# Patient Record
Sex: FEMALE | Race: WHITE | NOT HISPANIC OR LATINO | Employment: PART TIME | URBAN - METROPOLITAN AREA
[De-identification: names, ages, dates, MRNs, and addresses within clinical notes are randomized per-mention and may not be internally consistent; named-entity substitution may affect disease eponyms.]

---

## 2019-01-04 VITALS — WEIGHT: 233.5 LBS

## 2019-01-04 PROBLEM — R79.89 ABNORMAL LFTS: Status: ACTIVE | Noted: 2019-01-04

## 2019-01-04 PROBLEM — E88.810 DYSMETABOLIC SYNDROME: Status: ACTIVE | Noted: 2019-01-04

## 2019-01-04 PROBLEM — M54.9 BACK PAIN: Status: ACTIVE | Noted: 2019-01-04

## 2019-01-04 PROBLEM — E28.2 PCOS (POLYCYSTIC OVARIAN SYNDROME): Status: ACTIVE | Noted: 2019-01-04

## 2019-01-04 PROBLEM — K21.9 GERD (GASTROESOPHAGEAL REFLUX DISEASE): Status: ACTIVE | Noted: 2019-01-04

## 2019-01-04 PROBLEM — G47.33 OSA (OBSTRUCTIVE SLEEP APNEA): Status: ACTIVE | Noted: 2019-01-04

## 2019-01-04 PROBLEM — J45.909 ASTHMA: Status: ACTIVE | Noted: 2019-01-04

## 2019-01-04 PROBLEM — D50.9 IRON DEFICIENCY ANEMIA: Status: ACTIVE | Noted: 2019-01-04

## 2019-01-04 PROBLEM — N30.10 INTERSTITIAL CYSTITIS: Status: ACTIVE | Noted: 2019-01-04

## 2019-01-04 PROBLEM — E88.81 DYSMETABOLIC SYNDROME: Status: ACTIVE | Noted: 2019-01-04

## 2019-01-04 PROBLEM — F32.A DEPRESSION: Status: ACTIVE | Noted: 2019-01-04

## 2019-01-04 PROBLEM — E05.90 HYPERTHYROIDISM: Status: ACTIVE | Noted: 2019-01-04

## 2019-01-04 PROBLEM — E78.5 HYPERLIPIDEMIA: Status: ACTIVE | Noted: 2019-01-04

## 2019-01-04 RX ORDER — METFORMIN HYDROCHLORIDE 500 MG/1
1000 TABLET, EXTENDED RELEASE ORAL
COMMUNITY
End: 2019-03-12 | Stop reason: SDUPTHER

## 2019-01-04 RX ORDER — MULTIPLE VITAMINS W/ MINERALS TAB 9MG-400MCG
2 TAB ORAL DAILY
COMMUNITY
End: 2022-08-15

## 2019-03-12 ENCOUNTER — OFFICE VISIT (OUTPATIENT)
Dept: BARIATRICS/WEIGHT MGMT | Facility: CLINIC | Age: 44
End: 2019-03-12

## 2019-03-12 VITALS
HEIGHT: 62 IN | DIASTOLIC BLOOD PRESSURE: 92 MMHG | SYSTOLIC BLOOD PRESSURE: 126 MMHG | WEIGHT: 231 LBS | BODY MASS INDEX: 42.51 KG/M2 | HEART RATE: 104 BPM

## 2019-03-12 DIAGNOSIS — E88.81 DYSMETABOLIC SYNDROME: ICD-10-CM

## 2019-03-12 DIAGNOSIS — F98.8 ATTENTION DEFICIT DISORDER, UNSPECIFIED HYPERACTIVITY PRESENCE: Primary | ICD-10-CM

## 2019-03-12 DIAGNOSIS — E66.01 OBESITY, CLASS III, BMI 40-49.9 (MORBID OBESITY) (HCC): ICD-10-CM

## 2019-03-12 DIAGNOSIS — F33.41 RECURRENT MAJOR DEPRESSIVE DISORDER, IN PARTIAL REMISSION (HCC): ICD-10-CM

## 2019-03-12 PROBLEM — M51.37 DEGENERATION OF LUMBOSACRAL INTERVERTEBRAL DISC: Status: ACTIVE | Noted: 2019-03-12

## 2019-03-12 PROBLEM — N18.1 STAGE 1 CHRONIC KIDNEY DISEASE: Status: ACTIVE | Noted: 2019-03-12

## 2019-03-12 PROBLEM — M51.379 DEGENERATION OF LUMBOSACRAL INTERVERTEBRAL DISC: Status: ACTIVE | Noted: 2019-03-12

## 2019-03-12 PROBLEM — F31.9 DEPRESSED BIPOLAR I DISORDER (HCC): Status: ACTIVE | Noted: 2019-03-12

## 2019-03-12 PROBLEM — F90.9 ATTENTION DEFICIT HYPERACTIVITY DISORDER: Status: ACTIVE | Noted: 2019-03-12

## 2019-03-12 PROBLEM — G56.00 CARPAL TUNNEL SYNDROME: Status: ACTIVE | Noted: 2019-03-12

## 2019-03-12 PROCEDURE — MEDWTESTPT: Performed by: NURSE PRACTITIONER

## 2019-03-12 PROCEDURE — MEDWTBODYCOMP: Performed by: NURSE PRACTITIONER

## 2019-03-12 RX ORDER — CLONIDINE HYDROCHLORIDE 0.1 MG/1
TABLET ORAL EVERY 12 HOURS SCHEDULED
COMMUNITY
End: 2020-02-27 | Stop reason: SDUPTHER

## 2019-03-12 RX ORDER — LEVOTHYROXINE SODIUM 0.07 MG/1
TABLET ORAL EVERY 24 HOURS
COMMUNITY

## 2019-03-12 RX ORDER — BUPROPION HYDROCHLORIDE 300 MG/1
TABLET ORAL
COMMUNITY
End: 2019-07-03

## 2019-03-12 RX ORDER — MONTELUKAST SODIUM 10 MG/1
TABLET ORAL
COMMUNITY
End: 2020-02-27 | Stop reason: SDUPTHER

## 2019-03-12 RX ORDER — BIOTIN 5 MG
TABLET ORAL
Start: 2019-03-12 | End: 2020-02-27

## 2019-03-12 RX ORDER — ESCITALOPRAM OXALATE 10 MG/1
TABLET ORAL EVERY 24 HOURS
COMMUNITY
End: 2020-05-19 | Stop reason: ALTCHOICE

## 2019-03-12 RX ORDER — ESOMEPRAZOLE MAGNESIUM 40 MG/1
FOR SUSPENSION ORAL
COMMUNITY
End: 2019-10-31 | Stop reason: SDUPTHER

## 2019-03-12 RX ORDER — CYCLOBENZAPRINE HCL 10 MG
TABLET ORAL
COMMUNITY

## 2019-03-12 RX ORDER — FEXOFENADINE HCL 180 MG/1
TABLET ORAL
COMMUNITY

## 2019-03-12 NOTE — PROGRESS NOTES
"[facility] [place] [facility]   [facility] [address]  [place] [address]    Name: Princess Bailey  : 1975  Patient Care Team:  Dre Ann MD as PCP - General (Family Medicine)    Chief Complaint;:   Chief Complaint   Patient presents with   • Weight Loss        HPI   Princess Bailey is a 44 y.o. female here to restart the weight loss program after an absence of 3 months. Patient is unsatisfied with weight loss progress and has no questions or concerns today.. There were no unexpected side effects. of previously used medications. The patient is not taking the recommended multivitamin and fish oil. The patient is not using a food journal. The BMI is Body mass index is 42.25 kg/m².. Has recently started TMS phychiatric therapy, is happy that depression is improving. Per patient the goal is to decrease psychiatric medications, knows she is at risk for serotonin syndrome.     The patient is exercising with a FITT score of:         Frequency Intensity Time Strength Training   [x]   0 [x]   0 [x]   0 [x]   0   []   1 (1-2x/week) []   1 (light) []   1 (<10 min) []   1 (1x/week)   []   2 (3-5x/week) []   2 (moderate) []   2 (10-20 min) []   2 (2x/week)   []   3 ([place])   []   3 (moderately hard)  []   4 (very hard) []   3 (20-30 min)  []   4 (>30 min) []   3 (3-4x/week)             VS   Vitals:    19 0841   BP: 126/92   BP Location: Left arm   Patient Position: Sitting   Cuff Size: Large Adult   Pulse: 104   Weight: 105 kg (231 lb)   Height: 157.5 cm (62\")       Change in weight since last visit: -3lb    Body composition analysis completed and showed:  %body fat: 47.2%  Total fat mass: 109lbs  Lean body mass: 122lbs    Measurements (in inches)  Neck: 15.5  Chest: 46.5  Waist: 46.5  Hips: 51  Thighs: 39.5    History    Past Medical History:   Diagnosis Date   • Anxiety    • Asthma    • Depression    • Fibromyalgia    • Hypertension    • Hypothyroid    • JELANI (obstructive sleep apnea)    • " PCOS (polycystic ovarian syndrome)    • PID (pelvic inflammatory disease)        Patient Active Problem List   Diagnosis   • Hyperlipidemia   • Dysmetabolic syndrome   • Abnormal LFTs   • Iron deficiency anemia   • Hyperthyroidism   • PCOS (polycystic ovarian syndrome)   • Depression   • JELANI (obstructive sleep apnea)   • Asthma   • GERD (gastroesophageal reflux disease)   • Back pain   • Interstitial cystitis   • ADD (attention deficit disorder)   • Attention deficit hyperactivity disorder   • Stage 1 chronic kidney disease   • Body mass index (BMI) of 40.0-44.9 in adult (CMS/[facility])   • Carpal tunnel syndrome   • Degeneration of lumbosacral intervertebral disc   • Depressed bipolar I disorder (CMS/[facility])       Allergies   Allergen Reactions   • Phenergan  [Promethazine Hcl] Anaphylaxis   • Contrast Dye Unknown (See Comments)     Was told from allergy testing   • Ibuprofen Itching   • Toradol [Ketorolac Tromethamine] Itching         Current Outpatient Medications:   •  Brexpiprazole (REXULTI) 0.5 MG tablet, Daily., Disp: , Rfl:   •  buPROPion XL (WELLBUTRIN XL) 300 MG 24 hr tablet, 1 tab(s), Disp: , Rfl:   •  Cholecalciferol (VITAMIN D3) 5000 units capsule capsule, Daily., Disp: , Rfl:   •  Chromium (CHROMEMATE PO), Take 1 capsule by mouth 3 (Three) Times a Day., Disp: , Rfl:   •  CloNIDine (CATAPRES) 0.1 MG tablet, Every 12 (Twelve) Hours., Disp: , Rfl:   •  cyclobenzaprine (FLEXERIL) 10 MG tablet, 1 tab(s), Disp: , Rfl:   •  escitalopram (LEXAPRO) 10 MG tablet, Daily., Disp: , Rfl:   •  esomeprazole (NexIUM) 40 MG packet, esomeprazole magnesium 40 mg capsule,delayed release, Disp: , Rfl:   •  fexofenadine (ALLEGRA ALLERGY) 180 MG tablet, Allegra 180 mg tablet  Daily, Disp: , Rfl:   •  levothyroxine (SYNTHROID) 75 MCG tablet, Daily., Disp: , Rfl:   •  Liraglutide (VICTOZA) 18 MG/3ML solution pen-injector injection, Daily., Disp: , Rfl:   •  lisdexamfetamine (VYVANSE) 50 MG capsule, 1 cap(s), Disp: , Rfl:   •  metFORMIN  (GLUCOPHAGE) 1000 MG tablet, Every 12 (Twelve) Hours., Disp: , Rfl:   •  Milnacipran HCl (SAVELLA) 100 MG tablet, Every 12 (Twelve) Hours., Disp: , Rfl:   •  montelukast (SINGULAIR) 10 MG tablet, 1 tab(s), Disp: , Rfl:   •  Multiple Vitamins-Minerals (MULTIVITAMIN WITH MINERALS) tablet tablet, Take 2 tablets by mouth Daily., Disp: , Rfl:   •  Unable to find, Take 1 each by mouth Daily. Med Name: Rodex Forte (( Timed released B-12 )), Disp: , Rfl:   •  Krill Oil 1000 MG capsule, Take one tablet twice daily., Disp: , Rfl:   •  Liraglutide (VICTOZA) 18 MG/3ML solution pen-injector injection, Inject 2.4 mg under the skin into the appropriate area as directed Daily. 2.4mg by subcutaneous route every day., Disp: 2 pen, Rfl: 2    Physical Exam:    General:  well developed; well nourished  no acute distress  obese    Lungs:  breathing is unlabored  clear to auscultation bilaterally   Heart:  regular rate and rhythm, S1, S2 normal, no murmur, click, rub or gallop       ASSESSMENT/PLAN:   Princess was seen today for weight loss.    Diagnoses and all orders for this visit:    Attention deficit disorder, unspecified hyperactivity presence    Obesity, Class III, BMI 40-49.9 (morbid obesity) (CMS/HCC)  -     Liraglutide (VICTOZA) 18 MG/3ML solution pen-injector injection; Inject 2.4 mg under the skin into the appropriate area as directed Daily. 2.4mg by subcutaneous route every day.  -     Krill Oil 1000 MG capsule; Take one tablet twice daily.    Dysmetabolic syndrome    Recurrent major depressive disorder, in partial remission (CMS/HCC)  Comments:  Psychiatrist: Dr. Sony Gruber at [facility] treating with TMS therapy.   local psychiatrist: ADAL Cifuentes with [facility]        Restart program and meds. Being back nutritional focus and work on lifestyle behavioral changes. Recommend restarting food journal using new goal card for guidance. she will work towards initial goal of loss of  5-10% of  beginning body weight     I have instructed patient to restart the pursuit of medical weight loss as a part of this program. Patient does meet criteria for use of anorectics at this time as BMI >27, body fat percentage >30%, hyperlipidemia and hypertension, however she is using vyvanse for ADD so we will avoid sympathomimetics. Victoza is very helpful in decreasing appetite, specifically carb cravings. Understands this is off label usage. The current plan for this month includes: patient will food journal, exercise, walk and hit their water goal at least 2 days per week.  Discussed SMART goals.     Plan of care reviewed with patient at the conclusion of today's visit. We discussed the risks, benefits, and limitations of treatments. Patient verbalizes understanding of and agreement with management plan.     Return in about 1 month (around 4/12/2019).     ZARINA Barraza

## 2019-03-18 DIAGNOSIS — E66.01 OBESITY, CLASS III, BMI 40-49.9 (MORBID OBESITY) (HCC): ICD-10-CM

## 2019-03-18 DIAGNOSIS — E88.81 DYSMETABOLIC SYNDROME: ICD-10-CM

## 2019-03-18 DIAGNOSIS — E28.2 PCOS (POLYCYSTIC OVARIAN SYNDROME): Primary | ICD-10-CM

## 2019-03-18 RX ORDER — METFORMIN HYDROCHLORIDE 500 MG/1
1000 TABLET, EXTENDED RELEASE ORAL 2 TIMES DAILY WITH MEALS
Qty: 360 TABLET | Refills: 0 | Status: SHIPPED | OUTPATIENT
Start: 2019-03-18 | End: 2019-06-08 | Stop reason: SDUPTHER

## 2019-03-18 RX ORDER — METFORMIN HYDROCHLORIDE 500 MG/1
1000 TABLET, EXTENDED RELEASE ORAL 2 TIMES DAILY WITH MEALS
COMMUNITY
End: 2019-03-18 | Stop reason: SDUPTHER

## 2019-03-18 NOTE — TELEPHONE ENCOUNTER
Pt called stating she was in office with Larisa last week. Metformin was not sent in. Pt requesting a 90 day supply due to insurance.

## 2019-04-09 ENCOUNTER — TELEPHONE (OUTPATIENT)
Dept: BARIATRICS/WEIGHT MGMT | Facility: CLINIC | Age: 44
End: 2019-04-09

## 2019-04-09 ENCOUNTER — OFFICE VISIT (OUTPATIENT)
Dept: BARIATRICS/WEIGHT MGMT | Facility: CLINIC | Age: 44
End: 2019-04-09

## 2019-04-09 VITALS
DIASTOLIC BLOOD PRESSURE: 90 MMHG | SYSTOLIC BLOOD PRESSURE: 142 MMHG | HEART RATE: 104 BPM | HEIGHT: 62 IN | WEIGHT: 230 LBS | BODY MASS INDEX: 42.33 KG/M2

## 2019-04-09 DIAGNOSIS — I10 ESSENTIAL HYPERTENSION: Primary | ICD-10-CM

## 2019-04-09 DIAGNOSIS — E66.01 OBESITY, CLASS III, BMI 40-49.9 (MORBID OBESITY) (HCC): ICD-10-CM

## 2019-04-09 DIAGNOSIS — E88.81 INSULIN RESISTANCE: ICD-10-CM

## 2019-04-09 PROCEDURE — MEDWTINJ PR MEDICAL WT LOSS INJECTION: Performed by: NURSE PRACTITIONER

## 2019-04-09 PROCEDURE — MEDWTESTPT: Performed by: NURSE PRACTITIONER

## 2019-04-09 RX ORDER — CYANOCOBALAMIN 1000 UG/ML
1000 INJECTION, SOLUTION INTRAMUSCULAR; SUBCUTANEOUS ONCE
Status: COMPLETED | OUTPATIENT
Start: 2019-04-09 | End: 2019-04-09

## 2019-04-09 RX ADMIN — CYANOCOBALAMIN 1000 MCG: 1000 INJECTION, SOLUTION INTRAMUSCULAR; SUBCUTANEOUS at 11:00

## 2019-04-09 NOTE — TELEPHONE ENCOUNTER
[facility] called wanting clarification on the Victoza for the patient. Call was forwarded to Larisa.

## 2019-04-09 NOTE — PROGRESS NOTES
"[facility] [place] [facility]   [address]  [place] [address]    Name: Princess Bailey  : 1975  Patient Care Team:  Dre Ann MD as PCP - General (Family Medicine)    Chief Complaint;:   Chief Complaint   Patient presents with   • Follow-up        HPI      Princess Bailey is a 44 y.o. female here to follow up in active weight loss. Patient is unsatisfied with weight loss progress and has no questions or concerns today.. There were no unexpected side effects.. The medication changes were savella was stopped.  There were no changes in medical or surgical history The patient is taking the recommended multivitamin and fish oil. Hunger control has remain unchanged The patient is not exercising. The patient is not using a food journal. The BMI is Body mass index is 42.07 kg/m²..  Has had increased stressors over the last 2 weeks.  Did notice some binge tendencies, still keeping junk food out of the house but occasionally stopping for sugary beverage.     The patient is exercising with a FITT score of:    Frequency Intensity Time Strength Training   [x]   0 [x]   0 [x]   0 [x]   0   []   1 []   1 []   1 []   1   []   2 []   2 []   2 []   2   []   3  []   4 []   3  []   4 []   3  []   4 []   3  []   4     Change in weight since last visit: 1lb lost    Current Weight: 230lbs .  Recent Weight History:  Wt Readings from Last 3 Encounters:   19 104 kg (230 lb)   19 105 kg (231 lb)   18 106 kg (233 lb 8 oz)     Start weight: 238lb  Total loss: 8lb/3.36% BBW    VS   Vitals:    19 0925   BP: 142/90   BP Location: Left arm   Patient Position: Sitting   Cuff Size: Large Adult   Pulse: 104   Weight: 104 kg (230 lb)   Height: 157.5 cm (62\")     Measurements (in inches)  Neck: 16  Chest: 47  Waist: 48  Hips: 50.5  Thighs: 39.5    Treatment Objectives    Weight Loss Goal: 5-10% loss of beginning body weight   Non-Weight Loss Goals: Improved blood glucose: has been " addressed., Improved mobility: has been addressed.  and Improved energy: has been addressed.     Past Medical History:   Diagnosis Date   • Anxiety    • Asthma    • Depression    • Fibromyalgia    • Hypertension    • Hypothyroid    • JELANI (obstructive sleep apnea)    • PCOS (polycystic ovarian syndrome)    • PID (pelvic inflammatory disease)        Patient Active Problem List   Diagnosis   • Hyperlipidemia   • Dysmetabolic syndrome   • Abnormal LFTs   • Iron deficiency anemia   • Hyperthyroidism   • PCOS (polycystic ovarian syndrome)   • Depression   • JELANI (obstructive sleep apnea)   • Asthma   • GERD (gastroesophageal reflux disease)   • Back pain   • Interstitial cystitis   • ADD (attention deficit disorder)   • Attention deficit hyperactivity disorder   • Stage 1 chronic kidney disease   • Body mass index (BMI) of 40.0-44.9 in adult (CMS/HCC)   • Carpal tunnel syndrome   • Degeneration of lumbosacral intervertebral disc   • Depressed bipolar I disorder (CMS/[facility])       Allergies   Allergen Reactions   • Phenergan  [Promethazine Hcl] Anaphylaxis   • Contrast Dye Unknown (See Comments)     Was told from allergy testing   • Ibuprofen Itching   • Toradol [Ketorolac Tromethamine] Itching         Current Outpatient Medications:   •  Brexpiprazole (REXULTI) 0.5 MG tablet, Daily., Disp: , Rfl:   •  buPROPion XL (WELLBUTRIN XL) 300 MG 24 hr tablet, 1 tab(s), Disp: , Rfl:   •  Cholecalciferol (VITAMIN D3) 5000 units capsule capsule, Daily., Disp: , Rfl:   •  Chromium (CHROMEMATE PO), Take 1 capsule by mouth 3 (Three) Times a Day., Disp: , Rfl:   •  CloNIDine (CATAPRES) 0.1 MG tablet, Every 12 (Twelve) Hours., Disp: , Rfl:   •  cyclobenzaprine (FLEXERIL) 10 MG tablet, 1 tab(s), Disp: , Rfl:   •  escitalopram (LEXAPRO) 10 MG tablet, Daily., Disp: , Rfl:   •  esomeprazole (NexIUM) 40 MG packet, esomeprazole magnesium 40 mg capsule,delayed release, Disp: , Rfl:   •  fexofenadine (ALLEGRA ALLERGY) 180 MG tablet, Allegra 180 mg  tablet  Daily, Disp: , Rfl:   •  Insulin Pen Needle 32G X 4 MM misc, Use with victoza injection pen., Disp: 100 each, Rfl: 0  •  Krill Oil 1000 MG capsule, Take one tablet twice daily., Disp: , Rfl:   •  levothyroxine (SYNTHROID) 75 MCG tablet, Daily., Disp: , Rfl:   •  Liraglutide (VICTOZA) 18 MG/3ML solution pen-injector injection, Inject 1.8 mg under the skin into the appropriate area as directed Daily., Disp: 3 pen, Rfl: 2  •  Liraglutide (VICTOZA) 18 MG/3ML solution pen-injector injection, Inject 0.6 mg under the skin into the appropriate area as directed Daily. 2.4mg by subcutaneous route every day., Disp: 1 pen, Rfl: 2  •  lisdexamfetamine (VYVANSE) 50 MG capsule, 1 cap(s), Disp: , Rfl:   •  metFORMIN ER (GLUCOPHAGE-XR) 500 MG 24 hr tablet, Take 2 tablets by mouth 2 (Two) Times a Day With Meals., Disp: 360 tablet, Rfl: 0  •  montelukast (SINGULAIR) 10 MG tablet, 1 tab(s), Disp: , Rfl:   •  Multiple Vitamins-Minerals (MULTIVITAMIN WITH MINERALS) tablet tablet, Take 2 tablets by mouth Daily., Disp: , Rfl:   •  Unable to find, Take 1 each by mouth Daily. Med Name: Rodex Forte (( Timed released B-12 )), Disp: , Rfl:   •  Milnacipran HCl (SAVELLA) 100 MG tablet, Every 12 (Twelve) Hours., Disp: , Rfl:     Current Facility-Administered Medications:   •  cyanocobalamin injection 1,000 mcg, 1,000 mcg, Intramuscular, Once, Larisa Mora, APRN    Physical Exam:    General:  .well developed; well nourished  no acute distress  obese    Lungs:  breathing is unlabored  clear to auscultation bilaterally   Heart:  regular rate and rhythm, S1, S2 normal, no murmur, click, rub or gallop       ASSESSMENT/PLAN:   Princess was seen today for follow-up.    Diagnoses and all orders for this visit:    Essential hypertension    Obesity, Class III, BMI 40-49.9 (morbid obesity) (CMS/[facility])  -     Liraglutide (VICTOZA) 18 MG/3ML solution pen-injector injection; Inject 1.8 mg under the skin into the appropriate area as directed Daily.  -      Liraglutide (VICTOZA) 18 MG/3ML solution pen-injector injection; Inject 0.6 mg under the skin into the appropriate area as directed Daily. 2.4mg by subcutaneous route every day.    Insulin resistance  -     cyanocobalamin injection 1,000 mcg      Blood pressure is slightly elevated today.  Has had stressful last couple of weeks.  We will continue to follow and consider low-dose blood pressure medication if not improved at next visit weight loss should help.  I have instructed the patient to continue with pursuit of medical weight loss as a part of this program. Continue nutritional focus and work towards new exercise FITT goal of:         Frequency Intensity Time Strength Training   []   0 []   0 []   0 [x]   0   []   1 [x]   1 [x]   1 []   1   [x]   2 []   2 []   2 []   2   []   3   []   3  []   4 []   3  []   4 []   3       The current plan for this month includes: adjust exercise as discussed, increase water to recommended daily amount, weight loss goal 4-6lbs this month and continue to work on lifestyle behavioral changes.  Hit water goal of 100 ounces at least twice a week, keep up food journaling, and add exercise.    Plan of care reviewed with the patient at the conclusion of today's visit. We discussed the risks, benefits, and limitations of treatments. Continue medications and OTC supplements as discussed. Patient verbalizes understanding of and agreement with management plan.      Return in about 1 month (around 5/9/2019).     ZARINA Barraza

## 2019-04-16 RX ORDER — CYANOCOBALAMIN 1000 UG/ML
1000 INJECTION, SOLUTION INTRAMUSCULAR; SUBCUTANEOUS ONCE
Status: DISCONTINUED | OUTPATIENT
Start: 2019-04-16 | End: 2019-10-31

## 2019-06-08 DIAGNOSIS — E88.81 DYSMETABOLIC SYNDROME: ICD-10-CM

## 2019-06-08 DIAGNOSIS — E28.2 PCOS (POLYCYSTIC OVARIAN SYNDROME): ICD-10-CM

## 2019-06-10 RX ORDER — METFORMIN HYDROCHLORIDE 500 MG/1
TABLET, EXTENDED RELEASE ORAL
Qty: 360 TABLET | Refills: 0 | Status: SHIPPED | OUTPATIENT
Start: 2019-06-10 | End: 2019-07-03 | Stop reason: SDUPTHER

## 2019-07-03 ENCOUNTER — OFFICE VISIT (OUTPATIENT)
Dept: BARIATRICS/WEIGHT MGMT | Facility: CLINIC | Age: 44
End: 2019-07-03

## 2019-07-03 VITALS
HEIGHT: 62 IN | HEART RATE: 92 BPM | BODY MASS INDEX: 42.05 KG/M2 | DIASTOLIC BLOOD PRESSURE: 80 MMHG | SYSTOLIC BLOOD PRESSURE: 138 MMHG | WEIGHT: 228.5 LBS

## 2019-07-03 DIAGNOSIS — E88.81 DYSMETABOLIC SYNDROME: ICD-10-CM

## 2019-07-03 DIAGNOSIS — E66.01 OBESITY, CLASS III, BMI 40-49.9 (MORBID OBESITY) (HCC): ICD-10-CM

## 2019-07-03 DIAGNOSIS — E28.2 PCOS (POLYCYSTIC OVARIAN SYNDROME): ICD-10-CM

## 2019-07-03 PROCEDURE — MEDWTESTPT: Performed by: NURSE PRACTITIONER

## 2019-07-03 PROCEDURE — MEDWTINJ PR MEDICAL WT LOSS INJECTION: Performed by: NURSE PRACTITIONER

## 2019-07-03 RX ORDER — METFORMIN HYDROCHLORIDE 500 MG/1
1000 TABLET, EXTENDED RELEASE ORAL 2 TIMES DAILY WITH MEALS
Qty: 360 TABLET | Refills: 0 | Status: SHIPPED | OUTPATIENT
Start: 2019-07-03 | End: 2019-07-31

## 2019-07-03 RX ORDER — CYANOCOBALAMIN 1000 UG/ML
1000 INJECTION, SOLUTION INTRAMUSCULAR; SUBCUTANEOUS ONCE
Status: COMPLETED | OUTPATIENT
Start: 2019-07-03 | End: 2019-07-03

## 2019-07-03 RX ADMIN — CYANOCOBALAMIN 1000 MCG: 1000 INJECTION, SOLUTION INTRAMUSCULAR; SUBCUTANEOUS at 11:05

## 2019-07-03 NOTE — PROGRESS NOTES
[facility] [place] [facility]   [facility] [address]  [place] [address]    Name: Princess Bailey  : 1975  Patient Care Team:  Dre Ann MD as PCP - General (Family Medicine)    Chief Complaint;:   Chief Complaint   Patient presents with   • Follow-up        HPI      Princess Bailey is a 44 y.o. female here to follow up in active weight loss. Patient is unsatisfied with weight loss progress and has concerns about lack of motivation. There were no unexpected side effects.. The medication changes were, Wellbutrin and Savella were discontinued by another provider. There were no changes in medical or surgical history The patient is not taking the recommended multivitamin and fish oil. Hunger control has remain unchanged The patient is not exercising. The patient is not using a food journal. The BMI is Body mass index is 41.79 kg/m².. Depression is very well controlled per patient, but she is struggling with lack of motivation to lose weight. She discussed that she knows she needs to lose weight due to family history of heart disease, but has been unable to make changes in habits due to lifestyle challenges and family obligations.     The patient is exercising with a FITT score of:    Frequency   Intensity Time Strength Training   [x]   0 None  [x]   0 None  [x]   0 None  [x]   0 None    []   1 (1-2x/week) []   1 (light) []   1 (<10 min) []   1 (1x/week)   []   2 (3-5x/week) []   2 (moderate) []   2 (10-20 min) []   2 (2x/week)   []   3 (daily)   []   3 (moderately hard)  []   4 (very hard) []   3 (20-30 min)  []   4 (>30 min) []   3 (3-4x/week)       Change in weight since last visit: 1.5lbs lost    Current Weight: 228.5lbs .  Recent Weight History:    Wt Readings from Last 3 Encounters:   19 104 kg (228 lb 8 oz)   19 104 kg (230 lb)   19 105 kg (231 lb)     Start Weight: 238lbs  Total Loss/%Loss of BBW: -3.99%    VS   Vitals:    19 1017   BP: 138/80   BP  "Location: Right arm   Patient Position: Sitting   Cuff Size: Large Adult   Pulse: 92   Weight: 104 kg (228 lb 8 oz)   Height: 157.5 cm (62\")       Measurements (in inches)  Neck: 15.5  Chest: 44  Waist: 47  Hips: 50  Thighs: 38    Treatment Objectives   Weight Loss Goal: 5-10% loss of beginning body weight   Non-Weight Loss Goals: Improved blood pressure: has been addressed. and Improved energy: has been addressed.     Past Medical History:   Diagnosis Date   • Anxiety    • Asthma    • Depression    • Fibromyalgia    • Hypertension    • Hypothyroid    • JELANI (obstructive sleep apnea)    • PCOS (polycystic ovarian syndrome)    • PID (pelvic inflammatory disease)        Patient Active Problem List   Diagnosis   • Hyperlipidemia   • Dysmetabolic syndrome   • Abnormal LFTs   • Iron deficiency anemia   • Hyperthyroidism   • PCOS (polycystic ovarian syndrome)   • Depression   • JELANI (obstructive sleep apnea)   • Asthma   • GERD (gastroesophageal reflux disease)   • Back pain   • Interstitial cystitis   • ADD (attention deficit disorder)   • Attention deficit hyperactivity disorder   • Stage 1 chronic kidney disease   • Body mass index (BMI) of 40.0-44.9 in adult (CMS/HCC)   • Carpal tunnel syndrome   • Degeneration of lumbosacral intervertebral disc   • Depressed bipolar I disorder (CMS/HCC)       Allergies   Allergen Reactions   • Phenergan  [Promethazine Hcl] Anaphylaxis   • Contrast Dye Unknown (See Comments)     Was told from allergy testing   • Ibuprofen Itching   • Toradol [Ketorolac Tromethamine] Itching         Current Outpatient Medications:   •  Brexpiprazole (REXULTI) 0.5 MG tablet, Daily., Disp: , Rfl:   •  Cholecalciferol (VITAMIN D3) 5000 units capsule capsule, Daily., Disp: , Rfl:   •  Chromium (CHROMEMATE PO), Take 1 capsule by mouth 3 (Three) Times a Day., Disp: , Rfl:   •  CloNIDine (CATAPRES) 0.1 MG tablet, Every 12 (Twelve) Hours., Disp: , Rfl:   •  cyclobenzaprine (FLEXERIL) 10 MG tablet, 1 tab(s), " Disp: , Rfl:   •  escitalopram (LEXAPRO) 10 MG tablet, Daily., Disp: , Rfl:   •  esomeprazole (NexIUM) 40 MG packet, esomeprazole magnesium 40 mg capsule,delayed release, Disp: , Rfl:   •  fexofenadine (ALLEGRA ALLERGY) 180 MG tablet, Allegra 180 mg tablet  Daily, Disp: , Rfl:   •  Insulin Pen Needle 32G X 4 MM misc, Use with victoza injection pen., Disp: 100 each, Rfl: 0  •  Krill Oil 1000 MG capsule, Take one tablet twice daily., Disp: , Rfl:   •  levothyroxine (SYNTHROID) 75 MCG tablet, Daily., Disp: , Rfl:   •  Liraglutide (VICTOZA) 18 MG/3ML solution pen-injector injection, Inject 1.8 mg under the skin into the appropriate area as directed Daily., Disp: 3 pen, Rfl: 2  •  Liraglutide (VICTOZA) 18 MG/3ML solution pen-injector injection, Inject 0.6 mg under the skin into the appropriate area as directed Daily. 2.4mg by subcutaneous route every day., Disp: 1 pen, Rfl: 2  •  lisdexamfetamine (VYVANSE) 50 MG capsule, 1 cap(s), Disp: , Rfl:   •  metFORMIN ER (GLUCOPHAGE-XR) 500 MG 24 hr tablet, Take 2 tablets by mouth 2 (Two) Times a Day With Meals., Disp: 360 tablet, Rfl: 0  •  montelukast (SINGULAIR) 10 MG tablet, 1 tab(s), Disp: , Rfl:   •  Multiple Vitamins-Minerals (MULTIVITAMIN WITH MINERALS) tablet tablet, Take 2 tablets by mouth Daily., Disp: , Rfl:   •  Unable to find, Take 1 each by mouth Daily. Med Name: Rodex Forte (( Timed released B-12 )), Disp: , Rfl:   •  RODEX FORTE, Take 1 capsule by mouth Daily. Take one to two capsules daily., Disp: , Rfl:     Current Facility-Administered Medications:   •  cyanocobalamin injection 1,000 mcg, 1,000 mcg, Intramuscular, Once, Larisa Mora APRN    Physical Exam:    General:  .well developed; well nourished  no acute distress  obese    Lungs:  breathing is unlabored  clear to auscultation bilaterally   Heart:  regular rate and rhythm, S1, S2 normal, no murmur, click, rub or gallop       ASSESSMENT/PLAN:   Princess was seen today for follow-up.    Diagnoses and all  orders for this visit:    Obesity, Class III, BMI 40-49.9 (morbid obesity) (CMS/[facility])  -     Liraglutide (VICTOZA) 18 MG/3ML solution pen-injector injection; Inject 1.8 mg under the skin into the appropriate area as directed Daily.  -     Liraglutide (VICTOZA) 18 MG/3ML solution pen-injector injection; Inject 0.6 mg under the skin into the appropriate area as directed Daily. 2.4mg by subcutaneous route every day.    PCOS (polycystic ovarian syndrome)  -     metFORMIN ER (GLUCOPHAGE-XR) 500 MG 24 hr tablet; Take 2 tablets by mouth 2 (Two) Times a Day With Meals.    Dysmetabolic syndrome  -     metFORMIN ER (GLUCOPHAGE-XR) 500 MG 24 hr tablet; Take 2 tablets by mouth 2 (Two) Times a Day With Meals.  -     cyanocobalamin injection 1,000 mcg  -     RODEX FORTE; Take 1 capsule by mouth Daily. Take one to two capsules daily.    I have instructed the patient to continue with pursuit of medical weight loss as a part of this program. Continue nutritional focus and work towards new exercise FITT goal of:     Frequency   Intensity Time Strength Training   []   0 None  []   0 None  []   0 None  []   0 None    []   1 (1-2x/week) [x]   1 (light) []   1 (<10 min) [x]   1 (1x/week)   [x]   2 (3-5x/week) [x]   2 (moderate) []   2 (10-20 min) []   2 (2x/week)   []   3 (daily)   []   3 (moderately hard)  []   4 (very hard) []   3 (20-30 min)  [x]   4 (>30 min) []   3 (3-4x/week)       The current plan for this month includes: goal calendar discussed: exercise, water, meal replacement for lunch. Advised to find psychologist to help with motivation, continue current medications. Find exercise accountability partner ( or friend). Goal 4-6lb.      Plan of care reviewed with the patient at the conclusion of today's visit. We discussed the risks, benefits, and limitations of treatments. Continue medications and OTC supplements as discussed. Patient verbalizes understanding of and agreement with management plan.      Return in about 1  month (around 8/3/2019) for Next scheduled follow up.     Larisa Mora APRN

## 2019-07-31 ENCOUNTER — OFFICE VISIT (OUTPATIENT)
Dept: BARIATRICS/WEIGHT MGMT | Facility: CLINIC | Age: 44
End: 2019-07-31

## 2019-07-31 VITALS
BODY MASS INDEX: 41.49 KG/M2 | HEIGHT: 62 IN | HEART RATE: 92 BPM | DIASTOLIC BLOOD PRESSURE: 80 MMHG | SYSTOLIC BLOOD PRESSURE: 118 MMHG | WEIGHT: 225.5 LBS

## 2019-07-31 DIAGNOSIS — E66.01 OBESITY, CLASS III, BMI 40-49.9 (MORBID OBESITY) (HCC): ICD-10-CM

## 2019-07-31 DIAGNOSIS — E88.81 DYSMETABOLIC SYNDROME: ICD-10-CM

## 2019-07-31 DIAGNOSIS — E28.2 PCOS (POLYCYSTIC OVARIAN SYNDROME): ICD-10-CM

## 2019-07-31 DIAGNOSIS — R53.83 OTHER FATIGUE: ICD-10-CM

## 2019-07-31 DIAGNOSIS — R19.7 DIARRHEA, UNSPECIFIED TYPE: Primary | ICD-10-CM

## 2019-07-31 PROCEDURE — MEDWTESTPT: Performed by: NURSE PRACTITIONER

## 2019-07-31 PROCEDURE — MEDWTINJ PR MEDICAL WT LOSS INJECTION: Performed by: NURSE PRACTITIONER

## 2019-07-31 RX ORDER — CYANOCOBALAMIN 1000 UG/ML
1000 INJECTION, SOLUTION INTRAMUSCULAR; SUBCUTANEOUS ONCE
Status: COMPLETED | OUTPATIENT
Start: 2019-07-31 | End: 2019-07-31

## 2019-07-31 RX ORDER — METFORMIN HYDROCHLORIDE 500 MG/1
TABLET, EXTENDED RELEASE ORAL
Qty: 270 TABLET | Refills: 0
Start: 2019-07-31 | End: 2019-10-31 | Stop reason: SDUPTHER

## 2019-07-31 RX ORDER — ZINC OXIDE 13 %
1 CREAM (GRAM) TOPICAL DAILY
Start: 2019-07-31 | End: 2020-02-27

## 2019-07-31 RX ADMIN — CYANOCOBALAMIN 1000 MCG: 1000 INJECTION, SOLUTION INTRAMUSCULAR; SUBCUTANEOUS at 10:55

## 2019-07-31 NOTE — PROGRESS NOTES
"[facility]   [facility] [address]  [place] [address]    Name: Princess Bailey  : 1975  Patient Care Team:  Dre Ann MD as PCP - General (Family Medicine)    Chief Complaint;:   Chief Complaint   Patient presents with   • Follow-up        HPI      Princess Bailey is a 44 y.o. female here to follow up in active weight loss. Patient is satisfied with weight loss progress and has no questions or concerns today.. There were no unexpected side effects.. There were no medication changes There were no changes in medical or surgical history The patient is taking the recommended multivitamin and is not taking fish oil. Hunger control has worsened  The patient is not exercising. The patient is not using a food journal. The BMI is Body mass index is 41.24 kg/m².. Has had a few more caffeinated beverages due to being tired from  being out of town, otherwise hit water goal most days this month. Took MVI every day this month.     The patient is exercising with a FITT score of:    Frequency   Intensity Time Strength Training   [x]   0 None  [x]   0 None  [x]   0 None  [x]   0 None    []   1 (1-2x/week) []   1 (light) []   1 (<10 min) []   1 (1x/week)   []   2 (3-5x/week) []   2 (moderate) []   2 (10-20 min) []   2 (2x/week)   []   3 (daily)   []   3 (moderately hard)  []   4 (very hard) []   3 (20-30 min)  []   4 (>30 min) []   3 (3-4x/week)       Change in weight since last visit: 3lbs lost    Current Weight: 225.5lbs .  Recent Weight History:   Wt Readings from Last 3 Encounters:   19 102 kg (225 lb 8 oz)   19 104 kg (228 lb 8 oz)   19 104 kg (230 lb)     Start Weight: 238lbs  Total Loss/%Loss of BBW: -5.25%    VS   Vitals:    19 1018   BP: 118/80   BP Location: Left arm   Patient Position: Sitting   Cuff Size: Large Adult   Pulse: 92   Weight: 102 kg (225 lb 8 oz)   Height: 157.5 cm (62\")       Measurements (in inches)  Neck: 16  Chest: " 47.5  Waist: 48  Hips: 51  Thighs: 39    Treatment Objectives   Weight Loss Goal: 5-10% loss of beginning body weight   Non-Weight Loss Goals: Improved blood glucose: has been addressed. and Improved energy: has been addressed.     Past Medical History:   Diagnosis Date   • Anxiety    • Asthma    • Depression    • Fibromyalgia    • Hypertension    • Hypothyroid    • JELANI (obstructive sleep apnea)    • PCOS (polycystic ovarian syndrome)    • PID (pelvic inflammatory disease)        Patient Active Problem List   Diagnosis   • Hyperlipidemia   • Dysmetabolic syndrome   • Abnormal LFTs   • Iron deficiency anemia   • Hyperthyroidism   • PCOS (polycystic ovarian syndrome)   • Depression   • JELANI (obstructive sleep apnea)   • Asthma   • GERD (gastroesophageal reflux disease)   • Back pain   • Interstitial cystitis   • ADD (attention deficit disorder)   • Attention deficit hyperactivity disorder   • Stage 1 chronic kidney disease   • Body mass index (BMI) of 40.0-44.9 in adult (CMS/HCC)   • Carpal tunnel syndrome   • Degeneration of lumbosacral intervertebral disc   • Depressed bipolar I disorder (CMS/HCC)       Allergies   Allergen Reactions   • Phenergan  [Promethazine Hcl] Anaphylaxis   • Contrast Dye Unknown (See Comments)     Was told from allergy testing   • Ibuprofen Itching   • Toradol [Ketorolac Tromethamine] Itching         Current Outpatient Medications:   •  Brexpiprazole (REXULTI) 0.5 MG tablet, Daily., Disp: , Rfl:   •  Cholecalciferol (VITAMIN D3) 5000 units capsule capsule, Daily., Disp: , Rfl:   •  Chromium (CHROMEMATE PO), Take 1 capsule by mouth 3 (Three) Times a Day., Disp: , Rfl:   •  CloNIDine (CATAPRES) 0.1 MG tablet, Every 12 (Twelve) Hours., Disp: , Rfl:   •  cyclobenzaprine (FLEXERIL) 10 MG tablet, 1 tab(s), Disp: , Rfl:   •  escitalopram (LEXAPRO) 10 MG tablet, Daily., Disp: , Rfl:   •  esomeprazole (NexIUM) 40 MG packet, esomeprazole magnesium 40 mg capsule,delayed release, Disp: , Rfl:   •   fexofenadine (ALLEGRA ALLERGY) 180 MG tablet, Allegra 180 mg tablet  Daily, Disp: , Rfl:   •  Insulin Pen Needle 32G X 4 MM misc, Use with victoza injection pen., Disp: 100 each, Rfl: 0  •  Krill Oil 1000 MG capsule, Take one tablet twice daily., Disp: , Rfl:   •  levothyroxine (SYNTHROID) 75 MCG tablet, Daily., Disp: , Rfl:   •  Liraglutide (VICTOZA) 18 MG/3ML solution pen-injector injection, Inject 1.8 mg under the skin into the appropriate area as directed Daily., Disp: 3 pen, Rfl: 2  •  Liraglutide (VICTOZA) 18 MG/3ML solution pen-injector injection, Inject 1.2 mg under the skin into the appropriate area as directed Daily. 2.4mg by subcutaneous route every day., Disp: 1 pen, Rfl: 2  •  lisdexamfetamine (VYVANSE) 50 MG capsule, 1 cap(s), Disp: , Rfl:   •  metFORMIN ER (GLUCOPHAGE-XR) 500 MG 24 hr tablet, Take one tablet by mouth in the morning and two by mouth with evening meal., Disp: 270 tablet, Rfl: 0  •  montelukast (SINGULAIR) 10 MG tablet, 1 tab(s), Disp: , Rfl:   •  Multiple Vitamins-Minerals (MULTIVITAMIN WITH MINERALS) tablet tablet, Take 2 tablets by mouth Daily., Disp: , Rfl:   •  RODEX FORTE, Take 1 capsule by mouth Daily. Take one to two capsules daily., Disp: , Rfl:   •  Unable to find, Take 1 each by mouth Daily. Med Name: Rodex Forte (( Timed released B-12 )), Disp: , Rfl:   •  Probiotic Product (PROBIOTIC DAILY) capsule, Take 1 tablet by mouth Daily., Disp: , Rfl:     Current Facility-Administered Medications:   •  cyanocobalamin injection 1,000 mcg, 1,000 mcg, Intramuscular, Once, Larisa Mora APRN  •  cyanocobalamin injection 1,000 mcg, 1,000 mcg, Intramuscular, Once, Larisa Mora APRN    Physical Exam:    General:  .well developed; well nourished  no acute distress  obese    Lungs:  breathing is unlabored  clear to auscultation bilaterally   Heart:  regular rate and rhythm, S1, S2 normal, no murmur, click, rub or gallop       ASSESSMENT/PLAN:   Princess was seen today for  follow-up.    Diagnoses and all orders for this visit:    Diarrhea, unspecified type  -     Probiotic Product (PROBIOTIC DAILY) capsule; Take 1 tablet by mouth Daily.    PCOS (polycystic ovarian syndrome)  -     metFORMIN ER (GLUCOPHAGE-XR) 500 MG 24 hr tablet; Take one tablet by mouth in the morning and two by mouth with evening meal.    Dysmetabolic syndrome  -     metFORMIN ER (GLUCOPHAGE-XR) 500 MG 24 hr tablet; Take one tablet by mouth in the morning and two by mouth with evening meal.    Obesity, Class III, BMI 40-49.9 (morbid obesity) (CMS/[facility])  -     Liraglutide (VICTOZA) 18 MG/3ML solution pen-injector injection; Inject 1.2 mg under the skin into the appropriate area as directed Daily. 2.4mg by subcutaneous route every day.  -     Probiotic Product (PROBIOTIC DAILY) capsule; Take 1 tablet by mouth Daily.  -     cyanocobalamin injection 1,000 mcg    Other fatigue  -     cyanocobalamin injection 1,000 mcg        I have instructed the patient to continue with pursuit of medical weight loss as a part of this program. Continue nutritional focus and work towards new exercise FITT goal of:     Frequency   Intensity Time Strength Training   []   0 None  []   0 None  []   0 None  []   0 None    []   1 (1-2x/week) [x]   1 (light) []   1 (<10 min) [x]   1 (1x/week)   [x]   2 (3-5x/week) [x]   2 (moderate) []   2 (10-20 min) []   2 (2x/week)   []   3 (daily)   []   3 (moderately hard)  []   4 (very hard) []   3 (20-30 min)  [x]   4 (>30 min) []   3 (3-4x/week)       The current plan for this month includes: Keep up great new habit of daily MVI, bring back focus with water intake, add exercise and FJ this month. Inc saxenda for improved hunger control. Decrease metformin to 1500mg due to constant diarrhea, take with food. Consider probiotic. Continue to hold FO until diarrhea resolves.     Plan of care reviewed with the patient at the conclusion of today's visit. We discussed the risks, benefits, and limitations of  treatments. Continue medications and OTC supplements as discussed. Patient verbalizes understanding of and agreement with management plan.      Return in about 1 month (around 8/31/2019) for Next scheduled follow up.     ZARINA Barraza

## 2019-09-03 ENCOUNTER — OFFICE VISIT (OUTPATIENT)
Dept: BARIATRICS/WEIGHT MGMT | Facility: CLINIC | Age: 44
End: 2019-09-03

## 2019-09-03 VITALS
HEART RATE: 92 BPM | WEIGHT: 224 LBS | HEIGHT: 62 IN | BODY MASS INDEX: 41.22 KG/M2 | DIASTOLIC BLOOD PRESSURE: 80 MMHG | SYSTOLIC BLOOD PRESSURE: 128 MMHG

## 2019-09-03 DIAGNOSIS — E66.01 OBESITY, CLASS III, BMI 40-49.9 (MORBID OBESITY) (HCC): ICD-10-CM

## 2019-09-03 DIAGNOSIS — E88.81 DYSMETABOLIC SYNDROME: ICD-10-CM

## 2019-09-03 PROCEDURE — MEDWTESTPT: Performed by: NURSE PRACTITIONER

## 2019-09-03 RX ORDER — HYDROXYZINE PAMOATE 25 MG/1
25 CAPSULE ORAL 3 TIMES DAILY PRN
Refills: 0 | COMMUNITY
Start: 2019-08-12

## 2019-09-03 RX ORDER — CYANOCOBALAMIN 1000 UG/ML
1000 INJECTION, SOLUTION INTRAMUSCULAR; SUBCUTANEOUS ONCE
Status: DISCONTINUED | OUTPATIENT
Start: 2019-09-03 | End: 2019-10-31

## 2019-09-03 RX ORDER — ESOMEPRAZOLE MAGNESIUM 40 MG/1
40 CAPSULE, DELAYED RELEASE ORAL DAILY
Refills: 11 | COMMUNITY
Start: 2019-08-05

## 2019-09-03 NOTE — PROGRESS NOTES
"[facility]   [facility] [address]  [place] [address]    Name: Princess Bailey  : 1975  Patient Care Team:  Dre Ann MD as PCP - General (Family Medicine)    Chief Complaint;:   Chief Complaint   Patient presents with   • Follow-up   • Nutrition Counseling        HPI      Princess Bailey is a 44 y.o. female here to follow up in active weight loss. Patient is satisfied with weight loss progress and has no questions or concerns today.. There were no unexpected side effects.. There were no medication changes There were no changes in medical or surgical history The patient is taking the recommended multivitamin and fish oil. Hunger control has improved The patient is exercising. The patient is not using a food journal. The BMI is Body mass index is 40.97 kg/m².. Has increased exercise.     The patient is exercising with a FITT score of:    Frequency   Intensity Time Strength Training   []   0 None  []   0 None  []   0 None  [x]   0 None    [x]   1 (1-2x/week) [x]   1 (light) []   1 (<10 min) []   1 (1x/week)   []   2 (3-5x/week) []   2 (moderate) []   2 (10-20 min) []   2 (2x/week)   []   3 (daily)   []   3 (moderately hard)  []   4 (very hard) [x]   3 (20-30 min)  []   4 (>30 min) []   3 (3-4x/week)       Change in weight since last visit: 1lb8oz   Current Weight:224  Recent Weight History:    Wt Readings from Last 3 Encounters:   19 102 kg (224 lb)   19 102 kg (225 lb 8 oz)   19 104 kg (228 lb 8 oz)     Start Weight: 238    VS   Vitals:    19 1139   BP: 128/80   BP Location: Left arm   Patient Position: Sitting   Cuff Size: Adult   Pulse: 92   Weight: 102 kg (224 lb)   Height: 157.5 cm (62\")       Measurements (in inches)  Waist: 47    Treatment Objectives   Weight Loss Goal: 5-10% loss of beginning body weight   Non-Weight Loss Goals: Improved blood pressure: has been addressed. and Improved energy: has been addressed.     Past Medical " History:   Diagnosis Date   • Anxiety    • Asthma    • Depression    • Fibromyalgia    • Hypertension    • Hypothyroid    • JELANI (obstructive sleep apnea)    • PCOS (polycystic ovarian syndrome)    • PID (pelvic inflammatory disease)        Patient Active Problem List   Diagnosis   • Hyperlipidemia   • Dysmetabolic syndrome   • Abnormal LFTs   • Iron deficiency anemia   • Hyperthyroidism   • PCOS (polycystic ovarian syndrome)   • Depression   • JELANI (obstructive sleep apnea)   • Asthma   • GERD (gastroesophageal reflux disease)   • Back pain   • Interstitial cystitis   • ADD (attention deficit disorder)   • Attention deficit hyperactivity disorder   • Stage 1 chronic kidney disease   • Body mass index (BMI) of 40.0-44.9 in adult (CMS/HCC)   • Carpal tunnel syndrome   • Degeneration of lumbosacral intervertebral disc   • Depressed bipolar I disorder (CMS/[facility])       Allergies   Allergen Reactions   • Phenergan  [Promethazine Hcl] Anaphylaxis   • Contrast Dye Unknown (See Comments)     Was told from allergy testing   • Ibuprofen Itching   • Toradol [Ketorolac Tromethamine] Itching         Current Outpatient Medications:   •  Brexpiprazole (REXULTI) 0.5 MG tablet, Daily., Disp: , Rfl:   •  Cholecalciferol (VITAMIN D3) 5000 units capsule capsule, Daily., Disp: , Rfl:   •  Chromium (CHROMEMATE PO), Take 1 capsule by mouth 3 (Three) Times a Day., Disp: , Rfl:   •  CloNIDine (CATAPRES) 0.1 MG tablet, Every 12 (Twelve) Hours., Disp: , Rfl:   •  cyclobenzaprine (FLEXERIL) 10 MG tablet, 1 tab(s), Disp: , Rfl:   •  escitalopram (LEXAPRO) 10 MG tablet, Daily., Disp: , Rfl:   •  esomeprazole (nexIUM) 40 MG capsule, Take 40 mg by mouth Daily., Disp: , Rfl: 11  •  fexofenadine (ALLEGRA ALLERGY) 180 MG tablet, Allegra 180 mg tablet  Daily, Disp: , Rfl:   •  Insulin Pen Needle 32G X 4 MM misc, Use with victoza injection pen., Disp: 100 each, Rfl: 0  •  levothyroxine (SYNTHROID) 75 MCG tablet, Daily., Disp: , Rfl:   •  Liraglutide  (VICTOZA) 18 MG/3ML solution pen-injector injection, Inject 1.8 mg under the skin into the appropriate area as directed Daily., Disp: 3 pen, Rfl: 2  •  Liraglutide (VICTOZA) 18 MG/3ML solution pen-injector injection, Inject 1.2 mg under the skin into the appropriate area as directed Daily. 3.0mg by subcutaneous route every day., Disp: 1 pen, Rfl: 2  •  lisdexamfetamine (VYVANSE) 50 MG capsule, 1 cap(s), Disp: , Rfl:   •  metFORMIN ER (GLUCOPHAGE-XR) 500 MG 24 hr tablet, Take one tablet by mouth in the morning and two by mouth with evening meal. (Patient taking differently: Take 500 mg by mouth. Taking 1000mg in am and two 500mg in the pm.), Disp: 270 tablet, Rfl: 0  •  montelukast (SINGULAIR) 10 MG tablet, 1 tab(s), Disp: , Rfl:   •  Multiple Vitamins-Minerals (MULTIVITAMIN WITH MINERALS) tablet tablet, Take 2 tablets by mouth Daily., Disp: , Rfl:   •  Probiotic Product (PROBIOTIC DAILY) capsule, Take 1 tablet by mouth Daily., Disp: , Rfl:   •  RODEX FORTE, Take 1 capsule by mouth Daily. Take one to two capsules daily., Disp: , Rfl:   •  esomeprazole (NexIUM) 40 MG packet, esomeprazole magnesium 40 mg capsule,delayed release, Disp: , Rfl:   •  hydrOXYzine pamoate (VISTARIL) 25 MG capsule, Take 25 mg by mouth 3 (Three) Times a Day As Needed., Disp: , Rfl: 0  •  Krill Oil 1000 MG capsule, Take one tablet twice daily., Disp: , Rfl:   •  Unable to find, Take 1 each by mouth Daily. Med Name: Rodex Forte (( Timed released B-12 )), Disp: , Rfl:     Current Facility-Administered Medications:   •  cyanocobalamin injection 1,000 mcg, 1,000 mcg, Intramuscular, Once, Larisa Mora, ZARINA    Physical Exam:    General:  .well developed; well nourished  no acute distress  obese    Lungs:  breathing is unlabored  clear to auscultation bilaterally   Heart:  regular rate and rhythm, S1, S2 normal, no murmur, click, rub or gallop       ASSESSMENT/PLAN:   Princess was seen today for follow-up and nutrition counseling.    Diagnoses and  all orders for this visit:    Body mass index (BMI) of 40.0-44.9 in adult (CMS/[facility])    Dysmetabolic syndrome  -     Insulin Pen Needle 32G X 4 MM misc; Use with victoza injection pen.    Obesity, Class III, BMI 40-49.9 (morbid obesity) (CMS/[facility])  -     Liraglutide (VICTOZA) 18 MG/3ML solution pen-injector injection; Inject 1.8 mg under the skin into the appropriate area as directed Daily.  -     Liraglutide (VICTOZA) 18 MG/3ML solution pen-injector injection; Inject 1.2 mg under the skin into the appropriate area as directed Daily. 3.0mg by subcutaneous route every day.  -     Insulin Pen Needle 32G X 4 MM misc; Use with victoza injection pen.     I have instructed the patient to continue with pursuit of medical weight loss as a part of this program. Continue nutritional focus and work towards new exercise FITT goal of:     Frequency   Intensity Time Strength Training   []   0 None  []   0 None  []   0 None  []   0 None    [x]   1 (1-2x/week) [x]   1 (light) []   1 (<10 min) []   1 (1x/week)   []   2 (3-5x/week) []   2 (moderate) []   2 (10-20 min) [x]   2 (2x/week)   []   3 (daily)   []   3 (moderately hard)  []   4 (very hard) [x]   3 (20-30 min)  []   4 (>30 min) []   3 (3-4x/week)       The current plan for this month includes: Adding resistance training with water aerobics twice a week, re-establish relationship with FJ, keep up great water and daily MVI.     Plan of care reviewed with the patient at the conclusion of today's visit. We discussed the risks, benefits, and limitations of treatments. Continue medications and OTC supplements as discussed. Patient verbalizes understanding of and agreement with management plan.      Return in about 1 month (around 10/3/2019) for Next scheduled follow up.     ZARINA Barraza

## 2019-10-31 ENCOUNTER — OFFICE VISIT (OUTPATIENT)
Dept: BARIATRICS/WEIGHT MGMT | Facility: CLINIC | Age: 44
End: 2019-10-31

## 2019-10-31 VITALS
DIASTOLIC BLOOD PRESSURE: 70 MMHG | WEIGHT: 226 LBS | BODY MASS INDEX: 41.59 KG/M2 | HEIGHT: 62 IN | HEART RATE: 80 BPM | SYSTOLIC BLOOD PRESSURE: 110 MMHG

## 2019-10-31 DIAGNOSIS — E88.81 DYSMETABOLIC SYNDROME: ICD-10-CM

## 2019-10-31 DIAGNOSIS — R53.83 OTHER FATIGUE: Primary | ICD-10-CM

## 2019-10-31 DIAGNOSIS — E28.2 PCOS (POLYCYSTIC OVARIAN SYNDROME): ICD-10-CM

## 2019-10-31 PROCEDURE — MEDWTESTPT: Performed by: NURSE PRACTITIONER

## 2019-10-31 RX ORDER — ALPRAZOLAM 0.5 MG/1
TABLET ORAL
COMMUNITY
Start: 2019-10-29

## 2019-10-31 RX ORDER — METFORMIN HYDROCHLORIDE 500 MG/1
TABLET, EXTENDED RELEASE ORAL
Qty: 270 TABLET | Refills: 0 | Status: SHIPPED | OUTPATIENT
Start: 2019-10-31 | End: 2020-01-20 | Stop reason: SDUPTHER

## 2019-10-31 RX ORDER — CYANOCOBALAMIN 1000 UG/ML
1000 INJECTION, SOLUTION INTRAMUSCULAR; SUBCUTANEOUS ONCE
Status: DISCONTINUED | OUTPATIENT
Start: 2019-10-31 | End: 2020-02-27

## 2019-10-31 NOTE — PROGRESS NOTES
"[facility]   [address]  [place] [address]    Name: Princess Bailey  : 1975  Patient Care Team:  Dre Ann MD as PCP - General (Family Medicine)    Chief Complaint;:   Chief Complaint   Patient presents with   • Weight Loss   • Nutrition Counseling        HPI      Princess Bailey is a 44 y.o. female here to follow up in active weight loss. Patient is unsatisfied with weight loss progress and has no questions or concerns today.. There were no unexpected side effects.. The medication changes were, patient is now taking 10mg of Lexapro, and is also taking Xanax PRN. There were no changes in medical or surgical history The patient is taking the recommended multivitamin and is not taking fish oil. Hunger control has remain unchanged The patient is not exercising. The patient is not using a food journal. The BMI is Body mass index is 41.34 kg/m².. Recently had conversation with  that she needs help/encouragement with exercise. Is not making any restrictions on herself right now, states her focus has been \"none\". Works from home, is having 2-4 sodas or sweets teas/day, not making any limits on those.     The patient is exercising with a FITT score of:    Frequency   Intensity Time Strength Training   [x]   0 None  [x]   0 None  [x]   0 None  [x]   0 None    []   1 (1-2x/week) []   1 (light) []   1 (<10 min) []   1 (1x/week)   []   2 (3-5x/week) []   2 (moderate) []   2 (10-20 min) []   2 (2x/week)   []   3 (daily)   []   3 (moderately hard)  []   4 (very hard) []   3 (20-30 min)  []   4 (>30 min) []   3 (3-4x/week)       Change in weight since last visit: 2lbs gained    Current Weight: 226lbs .  Recent Weight History:   Wt Readings from Last 3 Encounters:   10/31/19 103 kg (226 lb)   19 102 kg (224 lb)   19 102 kg (225 lb 8 oz)     Start Weight: 238lbs  Total Loss/%Loss of BBW:  -5.04%    VS   Vitals:    10/31/19 0826   BP: 110/70   BP " "Location: Left arm   Patient Position: Sitting   Cuff Size: Large Adult   Pulse: 80   Weight: 103 kg (226 lb)   Height: 157.5 cm (62\")       Measurements (in inches)  Neck: 15.5  Chest: 45  Waist: 46  Hips: 50  Thighs: 38    Treatment Objectives   Weight Loss Goal: 5-10% loss of beginning body weight   Non-Weight Loss Goals: Improved blood glucose: has been addressed., Improved blood pressure: has been addressed., Improved fatty liver: has been addressed. and Improved depression: has been addressed.    Past Medical History:   Diagnosis Date   • Anxiety    • Asthma    • Depression    • Fibromyalgia    • Hypertension    • Hypothyroid    • JELANI (obstructive sleep apnea)    • PCOS (polycystic ovarian syndrome)    • PID (pelvic inflammatory disease)        Patient Active Problem List   Diagnosis   • Hyperlipidemia   • Dysmetabolic syndrome   • Abnormal LFTs   • Iron deficiency anemia   • Hyperthyroidism   • PCOS (polycystic ovarian syndrome)   • Depression   • JELANI (obstructive sleep apnea)   • Asthma   • GERD (gastroesophageal reflux disease)   • Back pain   • Interstitial cystitis   • ADD (attention deficit disorder)   • Attention deficit hyperactivity disorder   • Stage 1 chronic kidney disease   • Body mass index (BMI) of 40.0-44.9 in adult (CMS/HCC)   • Carpal tunnel syndrome   • Degeneration of lumbosacral intervertebral disc   • Depressed bipolar I disorder (CMS/HCC)       Allergies   Allergen Reactions   • Phenergan  [Promethazine Hcl] Anaphylaxis   • Contrast Dye Unknown (See Comments)     Was told from allergy testing   • Ibuprofen Itching   • Toradol [Ketorolac Tromethamine] Itching         Current Outpatient Medications:   •  ALPRAZolam (XANAX) 0.5 MG tablet, , Disp: , Rfl:   •  Brexpiprazole (REXULTI) 0.5 MG tablet, Daily., Disp: , Rfl:   •  Cholecalciferol (VITAMIN D3) 5000 units capsule capsule, Daily., Disp: , Rfl:   •  Chromium (CHROMEMATE PO), Take 1 capsule by mouth 3 (Three) Times a Day., Disp: , Rfl: "   •  CloNIDine (CATAPRES) 0.1 MG tablet, Every 12 (Twelve) Hours., Disp: , Rfl:   •  cyclobenzaprine (FLEXERIL) 10 MG tablet, 1 tab(s), Disp: , Rfl:   •  escitalopram (LEXAPRO) 10 MG tablet, Daily., Disp: , Rfl:   •  esomeprazole (nexIUM) 40 MG capsule, Take 40 mg by mouth Daily., Disp: , Rfl: 11  •  fexofenadine (ALLEGRA ALLERGY) 180 MG tablet, Allegra 180 mg tablet  Daily, Disp: , Rfl:   •  hydrOXYzine pamoate (VISTARIL) 25 MG capsule, Take 25 mg by mouth 3 (Three) Times a Day As Needed., Disp: , Rfl: 0  •  Insulin Pen Needle 32G X 4 MM misc, Use with victoza injection pen., Disp: 100 each, Rfl: 0  •  Krill Oil 1000 MG capsule, Take one tablet twice daily., Disp: , Rfl:   •  levothyroxine (SYNTHROID) 75 MCG tablet, Daily., Disp: , Rfl:   •  Liraglutide (VICTOZA) 18 MG/3ML solution pen-injector injection, Inject 1.8 mg under the skin into the appropriate area as directed Daily., Disp: 3 pen, Rfl: 2  •  Liraglutide (VICTOZA) 18 MG/3ML solution pen-injector injection, Inject 1.2 mg under the skin into the appropriate area as directed Daily. 3.0mg by subcutaneous route every day., Disp: 1 pen, Rfl: 2  •  lisdexamfetamine (VYVANSE) 50 MG capsule, 1 cap(s), Disp: , Rfl:   •  metFORMIN ER (GLUCOPHAGE-XR) 500 MG 24 hr tablet, Take one tablet by mouth in the morning and two by mouth with evening meal., Disp: 270 tablet, Rfl: 0  •  montelukast (SINGULAIR) 10 MG tablet, 1 tab(s), Disp: , Rfl:   •  Multiple Vitamins-Minerals (MULTIVITAMIN WITH MINERALS) tablet tablet, Take 2 tablets by mouth Daily., Disp: , Rfl:   •  Probiotic Product (PROBIOTIC DAILY) capsule, Take 1 tablet by mouth Daily., Disp: , Rfl:   •  RODEX FORTE, Take 1 capsule by mouth Daily. Take one to two capsules daily., Disp: , Rfl:   •  Unable to find, Take 1 each by mouth Daily. Med Name: Vidya Lopez (( Timed released B-12 )), Disp: , Rfl:     Current Facility-Administered Medications:   •  cyanocobalamin injection 1,000 mcg, 1,000 mcg, Intramuscular, Once,  "Larisa Mora, APRN    Physical Exam:    General:  .well developed; well nourished  no acute distress  obese    Lungs:  breathing is unlabored  clear to auscultation bilaterally   Heart:  regular rate and rhythm, S1, S2 normal, no murmur, click, rub or gallop       ASSESSMENT/PLAN:   Princess was seen today for weight loss and nutrition counseling.    Diagnoses and all orders for this visit:    Other fatigue  -     cyanocobalamin injection 1,000 mcg    PCOS (polycystic ovarian syndrome)  -     metFORMIN ER (GLUCOPHAGE-XR) 500 MG 24 hr tablet; Take one tablet by mouth in the morning and two by mouth with evening meal.    Dysmetabolic syndrome  -     metFORMIN ER (GLUCOPHAGE-XR) 500 MG 24 hr tablet; Take one tablet by mouth in the morning and two by mouth with evening meal.      We spent a significant amount of time discussing motivation for weight loss. Knows she is not making any effort to reach her goal of losing weight to \"be healthier and decrease heart disease risk\". Not putting any restrictions on food or beverage intake, not exercising.       I have instructed the patient to continue with pursuit of medical weight loss as a part of this program. Patient does meet criteria for use of anorectics at this time as BMI >27. Continue nutritional focus and work towards new exercise FITT goal of:     Frequency   Intensity Time Strength Training   []   0 None  []   0 None  []   0 None  [x]   0 None    [x]   1 (1-2x/week) [x]   1 (light) []   1 (<10 min) []   1 (1x/week)   []   2 (3-5x/week) []   2 (moderate) [x]   2 (10-20 min) []   2 (2x/week)   []   3 (daily)   []   3 (moderately hard)  []   4 (very hard) []   3 (20-30 min)  []   4 (>30 min) []   3 (3-4x/week)       The current plan for this month includes: increase water in order to decrease soft drinks and sweet tea (2-4/day currently).      Plan of care reviewed with the patient at the conclusion of today's visit. We discussed the risks, benefits, and limitations of " treatments. Continue medications and OTC supplements as discussed. Patient verbalizes understanding of and agreement with management plan.      Return in about 1 month (around 11/30/2019) for Next scheduled follow up.     ZARINA Barraza

## 2020-01-20 DIAGNOSIS — E66.01 OBESITY, CLASS III, BMI 40-49.9 (MORBID OBESITY) (HCC): ICD-10-CM

## 2020-01-20 DIAGNOSIS — E88.81 DYSMETABOLIC SYNDROME: ICD-10-CM

## 2020-01-20 DIAGNOSIS — E28.2 PCOS (POLYCYSTIC OVARIAN SYNDROME): ICD-10-CM

## 2020-01-20 RX ORDER — METFORMIN HYDROCHLORIDE 500 MG/1
TABLET, EXTENDED RELEASE ORAL
Qty: 270 TABLET | Refills: 0 | Status: SHIPPED | OUTPATIENT
Start: 2020-01-20 | End: 2020-04-01 | Stop reason: SDUPTHER

## 2020-01-20 NOTE — TELEPHONE ENCOUNTER
Provider:  Trip Oscar)   Caller:  Automated refill request  Last visit:   10/31/2019  Next visit: 02/25/2020    Reason for call:         Requested Prescriptions     Pending Prescriptions Disp Refills   • Liraglutide (VICTOZA) 18 MG/3ML solution pen-injector injection 3 pen 2     Sig: Inject 1.8 mg under the skin into the appropriate area as directed Daily.   • metFORMIN ER (GLUCOPHAGE-XR) 500 MG 24 hr tablet 270 tablet 0     Sig: Take one tablet by mouth in the morning and two by mouth with evening meal.

## 2020-01-20 NOTE — TELEPHONE ENCOUNTER
Pt called to make new appt and is requesting refill on Victoza and Metformin sent to [facility] in [place].    Pt also noted that at least a 90 day supply will need to be sent for her insurance to cover it.

## 2020-02-27 ENCOUNTER — OFFICE VISIT (OUTPATIENT)
Dept: BARIATRICS/WEIGHT MGMT | Facility: CLINIC | Age: 45
End: 2020-02-27

## 2020-02-27 VITALS
WEIGHT: 235.5 LBS | HEART RATE: 113 BPM | OXYGEN SATURATION: 99 % | HEIGHT: 62 IN | BODY MASS INDEX: 43.34 KG/M2 | RESPIRATION RATE: 18 BRPM | DIASTOLIC BLOOD PRESSURE: 78 MMHG | TEMPERATURE: 97.2 F | SYSTOLIC BLOOD PRESSURE: 124 MMHG

## 2020-02-27 DIAGNOSIS — E88.81 DYSMETABOLIC SYNDROME: ICD-10-CM

## 2020-02-27 DIAGNOSIS — E28.2 PCOS (POLYCYSTIC OVARIAN SYNDROME): ICD-10-CM

## 2020-02-27 PROCEDURE — MEDWTESTPT: Performed by: NURSE PRACTITIONER

## 2020-02-27 RX ORDER — CLONIDINE HYDROCHLORIDE 0.1 MG/1
TABLET ORAL EVERY 12 HOURS SCHEDULED
COMMUNITY
End: 2021-03-23

## 2020-02-27 RX ORDER — CYANOCOBALAMIN 1000 UG/ML
1000 INJECTION, SOLUTION INTRAMUSCULAR; SUBCUTANEOUS ONCE
Status: DISCONTINUED | OUTPATIENT
Start: 2020-02-27 | End: 2020-05-19

## 2020-02-27 RX ORDER — LEVOTHYROXINE SODIUM 0.07 MG/1
TABLET ORAL EVERY 24 HOURS
COMMUNITY
End: 2020-02-27 | Stop reason: SDUPTHER

## 2020-02-27 RX ORDER — MONTELUKAST SODIUM 10 MG/1
TABLET ORAL
COMMUNITY
End: 2020-02-27 | Stop reason: SDUPTHER

## 2020-02-27 RX ORDER — MONTELUKAST SODIUM 10 MG/1
TABLET ORAL
COMMUNITY

## 2020-02-27 RX ORDER — ESCITALOPRAM OXALATE 20 MG/1
TABLET ORAL
COMMUNITY
End: 2020-02-27 | Stop reason: SDUPTHER

## 2020-02-27 NOTE — PROGRESS NOTES
[facility]   [address]  [place] [address]    Name: Princess Baiely  : 1975  Patient Care Team:  Dre Ann MD as PCP - General (Family Medicine)    Chief Complaint;:   Chief Complaint   Patient presents with   • Nutrition Counseling   • Weight Loss        HPI      Princess Bailey is a 45 y.o. female here to follow up in active weight loss. Patient is unsatisfied with weight loss progress and has no questions or concerns today.. There were no unexpected side effects.. The medication changes were Rexulti was increased to 1mg. There were no changes in medical or surgical history The patient is taking the recommended multivitamin. Hunger control has remain unchanged The patient is not exercising. The patient is not using a food journal. The BMI is Body mass index is 43.07 kg/m².43.1. Started seeing behavioralist who can help with over-eating. Over the last few weeks has been having anxiety, describes sharp pain in her mid-upper back and chest tightness/heaviness. Only lasts a few minutes and resolves when she lays down and relaxes. Not associated with activity. No shortness of air. Under a lot of stress with daughter graduating high school this year.     The patient is exercising with a FITT score of:    Frequency   Intensity Time Strength Training   [x]   0 None  [x]   0 None  [x]   0 None  [x]   0 None    []   1 (1-2x/week) []   1 (light) []   1 (<10 min) []   1 (1x/week)   []   2 (3-5x/week) []   2 (moderate) []   2 (10-20 min) []   2 (2x/week)   []   3 (daily)   []   3 (moderately hard)  []   4 (very hard) []   3 (20-30 min)  []   4 (>30 min) []   3 (3-4x/week)       Change in weight since last visit: 9.5 lb gain     Current Weight: 235.5 lb.  Recent Weight History:    Wt Readings from Last 3 Encounters:   20 107 kg (235 lb 8 oz)   10/31/19 103 kg (226 lb)   19 102 kg (224 lb)     Start Weight: 238 lb  Total Loss/%Loss of BBW: -1.05%    VS  "  Vitals:    02/27/20 1430   BP: 124/78   BP Location: Left arm   Patient Position: Sitting   Cuff Size: Large Adult   Pulse: 113   Resp: 18   Temp: 97.2 °F (36.2 °C)   TempSrc: Temporal   SpO2: 99%   Weight: 107 kg (235 lb 8 oz)   Height: 157.5 cm (62\")       Measurements (in inches)  Waist: 48.5    Body composition analysis   BMR: 1752  %body fat:50.2%  Total fat mass (in lbs):118.0  Fat free mass (in lbs):117.5  Total body water (in lb):86.0      Past Medical History:   Diagnosis Date   • Anxiety    • Asthma    • Depression    • Fibromyalgia    • Hypertension    • Hypothyroid    • JELANI (obstructive sleep apnea)    • PCOS (polycystic ovarian syndrome)    • PID (pelvic inflammatory disease)        Patient Active Problem List   Diagnosis   • Hyperlipidemia   • Dysmetabolic syndrome   • Abnormal LFTs   • Iron deficiency anemia   • Hyperthyroidism   • PCOS (polycystic ovarian syndrome)   • Depression   • JELANI (obstructive sleep apnea)   • Asthma   • GERD (gastroesophageal reflux disease)   • Back pain   • Interstitial cystitis   • ADD (attention deficit disorder)   • Attention deficit hyperactivity disorder   • Stage 1 chronic kidney disease   • Body mass index (BMI) of 40.0-44.9 in adult (CMS/HCC)   • Carpal tunnel syndrome   • Degeneration of lumbosacral intervertebral disc   • Depressed bipolar I disorder (CMS/HCC)       Allergies   Allergen Reactions   • Phenergan  [Promethazine Hcl] Anaphylaxis   • Contrast Dye Unknown (See Comments)     Was told from allergy testing   • Ibuprofen Itching   • Toradol [Ketorolac Tromethamine] Itching         Current Outpatient Medications:   •  ALPRAZolam (XANAX) 0.5 MG tablet, , Disp: , Rfl:   •  Cholecalciferol (VITAMIN D3) 5000 units capsule capsule, Daily., Disp: , Rfl:   •  cyclobenzaprine (FLEXERIL) 10 MG tablet, 1 tab(s), Disp: , Rfl:   •  Elastic Bandages & Supports (WRIST BRACE/LEFT MEDIUM) misc, -, Disp: , Rfl:   •  escitalopram (LEXAPRO) 10 MG tablet, Daily., Disp: , Rfl: "   •  esomeprazole (nexIUM) 40 MG capsule, Take 40 mg by mouth Daily., Disp: , Rfl: 11  •  fexofenadine (ALLEGRA ALLERGY) 180 MG tablet, Allegra 180 mg tablet  Daily, Disp: , Rfl:   •  hydrOXYzine pamoate (VISTARIL) 25 MG capsule, Take 25 mg by mouth 3 (Three) Times a Day As Needed., Disp: , Rfl: 0  •  Insulin Pen Needle 32G X 4 MM misc, Use with victoza injection pen., Disp: 100 each, Rfl: 0  •  levothyroxine (SYNTHROID) 75 MCG tablet, Daily., Disp: , Rfl:   •  Liraglutide (VICTOZA) 18 MG/3ML solution pen-injector injection, Inject 1.2 mg under the skin into the appropriate area as directed Daily. 3.0mg by subcutaneous route every day., Disp: 1 pen, Rfl: 2  •  Liraglutide (VICTOZA) 18 MG/3ML solution pen-injector injection, Inject 1.8 mg under the skin into the appropriate area as directed Daily., Disp: 3 pen, Rfl: 2  •  lisdexamfetamine (VYVANSE) 50 MG capsule, 1 cap(s), Disp: , Rfl:   •  metFORMIN ER (GLUCOPHAGE-XR) 500 MG 24 hr tablet, Take one tablet by mouth in the morning and two by mouth with evening meal., Disp: 270 tablet, Rfl: 0  •  Multiple Vitamins-Minerals (MULTIVITAMIN WITH MINERALS) tablet tablet, Take 2 tablets by mouth Daily., Disp: , Rfl:   •  RODEX FORTE, Take 1 capsule by mouth Daily. Take one to two capsules daily., Disp: , Rfl:   •  Brexpiprazole (REXULTI) 1 MG tablet, Rexulti 1 mg tablet, Disp: , Rfl:   •  cloNIDine (CATAPRES) 0.1 MG tablet, Every 12 (Twelve) Hours., Disp: , Rfl:   •  montelukast (SINGULAIR) 10 MG tablet, montelukast 10 mg tablet, Disp: , Rfl:     Current Facility-Administered Medications:   •  cyanocobalamin injection 1,000 mcg, 1,000 mcg, Intramuscular, Once, Larisa Mora, APRN    Physical Exam:    General:  .well developed; well nourished  no acute distress  obese    Lungs:  breathing is unlabored  clear to auscultation bilaterally   Heart:  regular rate and rhythm, S1, S2 normal, no murmur, click, rub or gallop       ASSESSMENT/PLAN:   Princess was seen today for nutrition  counseling and weight loss.    Diagnoses and all orders for this visit:    Dysmetabolic syndrome  -     RODEX FORTE; Take 1 capsule by mouth Daily. Take one to two capsules daily.  -     cyanocobalamin injection 1,000 mcg    PCOS (polycystic ovarian syndrome)    I have instructed the patient to continue with pursuit of medical weight loss as a part of this program. Patient does meet criteria for use of anorectics at this time as BMI >27. Continue nutritional focus and work towards new exercise FITT goal of:     Frequency   Intensity Time Strength Training   []   0 None  []   0 None  []   0 None  []   0 None    [x]   1 (1-2x/week) [x]   1 (light) [x]   1 (<10 min) [x]   1 (1x/week)   []   2 (3-5x/week) []   2 (moderate) []   2 (10-20 min) []   2 (2x/week)   []   3 (daily)   []   3 (moderately hard)  []   4 (very hard) []   3 (20-30 min)  []   4 (>30 min) []   3 (3-4x/week)       The current plan for this month includes: Continue weekly visits with behavioralist. Continue current weight loss medications. Discuss chest symptoms with Dr. Ann at appointment this month.     I spent 30 minutes face to face with patient and 20 minutes were spent counseling the patient on obesity and behavior change.     Plan of care reviewed with the patient at the conclusion of today's visit. We discussed the risks, benefits, and limitations of treatments. Continue medications and OTC supplements as discussed. Patient verbalizes understanding of and agreement with management plan.      Return in about 1 month (around 3/27/2020) for Next scheduled follow up.     ZARINA Barraza

## 2020-04-01 DIAGNOSIS — E88.81 DYSMETABOLIC SYNDROME: ICD-10-CM

## 2020-04-01 DIAGNOSIS — E28.2 PCOS (POLYCYSTIC OVARIAN SYNDROME): ICD-10-CM

## 2020-04-01 DIAGNOSIS — E66.01 OBESITY, CLASS III, BMI 40-49.9 (MORBID OBESITY) (HCC): ICD-10-CM

## 2020-04-02 RX ORDER — METFORMIN HYDROCHLORIDE 500 MG/1
TABLET, EXTENDED RELEASE ORAL
Qty: 270 TABLET | Refills: 0 | Status: SHIPPED | OUTPATIENT
Start: 2020-04-02 | End: 2020-05-19 | Stop reason: SDUPTHER

## 2020-05-04 DIAGNOSIS — E66.01 OBESITY, CLASS III, BMI 40-49.9 (MORBID OBESITY) (HCC): ICD-10-CM

## 2020-05-04 DIAGNOSIS — E88.81 DYSMETABOLIC SYNDROME: ICD-10-CM

## 2020-05-04 NOTE — TELEPHONE ENCOUNTER
Pt called in requesting a refill on Victoza/needles. Pt also stated that the Victoza has to be a 90day supply, or insurance will not cover. Please advise, thank you.

## 2020-05-19 ENCOUNTER — TELEMEDICINE (OUTPATIENT)
Dept: BARIATRICS/WEIGHT MGMT | Facility: CLINIC | Age: 45
End: 2020-05-19

## 2020-05-19 VITALS
BODY MASS INDEX: 43.98 KG/M2 | SYSTOLIC BLOOD PRESSURE: 120 MMHG | WEIGHT: 239 LBS | DIASTOLIC BLOOD PRESSURE: 80 MMHG | HEIGHT: 62 IN

## 2020-05-19 DIAGNOSIS — E66.01 OBESITY, CLASS III, BMI 40-49.9 (MORBID OBESITY) (HCC): ICD-10-CM

## 2020-05-19 DIAGNOSIS — E88.81 INSULIN RESISTANCE: Primary | ICD-10-CM

## 2020-05-19 DIAGNOSIS — E88.81 DYSMETABOLIC SYNDROME: ICD-10-CM

## 2020-05-19 DIAGNOSIS — E28.2 PCOS (POLYCYSTIC OVARIAN SYNDROME): ICD-10-CM

## 2020-05-19 PROCEDURE — MEDWTESTPT: Performed by: NURSE PRACTITIONER

## 2020-05-19 RX ORDER — BREXPIPRAZOLE 0.5 MG/1
1 TABLET ORAL DAILY
COMMUNITY
Start: 2020-03-29 | End: 2020-09-03 | Stop reason: ALTCHOICE

## 2020-05-19 RX ORDER — METFORMIN HYDROCHLORIDE 500 MG/1
TABLET, EXTENDED RELEASE ORAL
Qty: 270 TABLET | Refills: 0 | Status: SHIPPED | OUTPATIENT
Start: 2020-05-19 | End: 2020-08-31

## 2020-05-19 RX ORDER — CHOLESTYRAMINE 4 G/5.5G
POWDER, FOR SUSPENSION ORAL
COMMUNITY
Start: 2020-03-30 | End: 2020-05-19

## 2020-05-19 RX ORDER — CYANOCOBALAMIN 1000 UG/ML
1000 INJECTION, SOLUTION INTRAMUSCULAR; SUBCUTANEOUS
Qty: 10 ML | Refills: 0 | Status: SHIPPED | OUTPATIENT
Start: 2020-05-19 | End: 2020-08-04

## 2020-05-19 NOTE — PROGRESS NOTES
[facility] [place] [facility]   [facility] [address] [facility] [address]  [place] [address]    Name: Princess Bailey  : 1975  Patient Care Team:  Dre Ann MD as PCP - General (Family Medicine)    Chief Complaint;: No chief complaint on file.       HPI      Virtual Visit today due to COVID-19 pandemic and current guidelines for physical distancing.     Princess Bailey is a 45 y.o. female here to follow up in active weight loss. Patient is satisfied with weight loss progress and has no questions or concerns today.. There were no unexpected side effects.. The medication changes were: trintillix. There were no changes in medical or surgical history The patient is taking the recommended multivitamin and fish oil. Hunger control has remain unchanged The patient is exercising, bike riding with daughter some. The patient is not using a food journal. The BMI is Body mass index is 43.71 kg/m².. Thinks rexulti contribues to weight struggle, hoping to get off soon.     B: hard boiled or granola bar  L: premier protein  S: kind bar or cup of oikos triple zero yogurt  D: meat and veggies (grilled or baked chicken or pork tenderloin)       The patient is exercising with a FITT score of:    Frequency   Intensity Time Strength Training   []   0 None  []   0 None  []   0 None  [x]   0 None    [x]   1 (1-2x/week) [x]   1 (light) []   1 (<10 min) []   1 (1x/week)   []   2 (3-5x/week) []   2 (moderate) [x]   2 (10-20 min) []   2 (2x/week)   []   3 (daily)   []   3 (moderately hard)  []   4 (very hard) []   3 (20-30 min)  []   4 (>30 min) []   3 (3-4x/week)       Change in weight since last visit: +4lb    Recent Weight History:    Wt Readings from Last 3 Encounters:   20 108 kg (239 lb)   20 107 kg (235 lb 8 oz)   10/31/19 103 kg (226 lb)     Start Weight: 238lb  Total Loss/%Loss of BBW: +1lb  VS   Vitals:    20 0907   BP: 120/80   BP Location: Other (Comment)  Comment: patient reported at last dr visit  "  Weight: 108 kg (239 lb)   Height: 157.5 cm (62\")     Past Medical History:   Diagnosis Date   • Anxiety    • Asthma    • Depression    • Fibromyalgia    • Hypertension    • Hypothyroid    • JELANI (obstructive sleep apnea)    • PCOS (polycystic ovarian syndrome)    • PID (pelvic inflammatory disease)        Patient Active Problem List   Diagnosis   • Hyperlipidemia   • Dysmetabolic syndrome   • Abnormal LFTs   • Iron deficiency anemia   • Hyperthyroidism   • PCOS (polycystic ovarian syndrome)   • Depression   • JELANI (obstructive sleep apnea)   • Asthma   • GERD (gastroesophageal reflux disease)   • Back pain   • Interstitial cystitis   • ADD (attention deficit disorder)   • Attention deficit hyperactivity disorder   • Stage 1 chronic kidney disease   • Body mass index (BMI) of 40.0-44.9 in adult (CMS/[facility])   • Carpal tunnel syndrome   • Degeneration of lumbosacral intervertebral disc   • Depressed bipolar I disorder (CMS/[facility])       Allergies   Allergen Reactions   • Phenergan  [Promethazine Hcl] Anaphylaxis   • Contrast Dye Unknown (See Comments)     Was told from allergy testing   • Ibuprofen Itching   • Toradol [Ketorolac Tromethamine] Itching         Current Outpatient Medications:   •  ALPRAZolam (XANAX) 0.5 MG tablet, , Disp: , Rfl:   •  Cholecalciferol (VITAMIN D3) 5000 units capsule capsule, Daily., Disp: , Rfl:   •  cloNIDine (CATAPRES) 0.1 MG tablet, Every 12 (Twelve) Hours., Disp: , Rfl:   •  cyclobenzaprine (FLEXERIL) 10 MG tablet, 1 tab(s), Disp: , Rfl:   •  Elastic Bandages & Supports (WRIST BRACE/LEFT MEDIUM) misc, -, Disp: , Rfl:   •  esomeprazole (nexIUM) 40 MG capsule, Take 40 mg by mouth Daily., Disp: , Rfl: 11  •  fexofenadine (ALLEGRA ALLERGY) 180 MG tablet, Allegra 180 mg tablet  Daily, Disp: , Rfl:   •  hydrOXYzine pamoate (VISTARIL) 25 MG capsule, Take 25 mg by mouth 3 (Three) Times a Day As Needed., Disp: , Rfl: 0  •  Insulin Pen Needle 32G X 4 MM misc, Use with victoza injection pen., Disp: 100 " each, Rfl: 1  •  levothyroxine (SYNTHROID) 75 MCG tablet, Daily., Disp: , Rfl:   •  Liraglutide (Victoza) 18 MG/3ML solution pen-injector injection, Inject 1.8 mg under the skin into the appropriate area as directed Daily. Total daily dose 2.4mg (1.8mg+0.6mg), Disp: 6 pen, Rfl: 2  •  lisdexamfetamine (VYVANSE) 50 MG capsule, 1 cap(s), Disp: , Rfl:   •  metFORMIN ER (GLUCOPHAGE-XR) 500 MG 24 hr tablet, Take one tablet by mouth in the morning and two by mouth with evening meal., Disp: 270 tablet, Rfl: 0  •  montelukast (SINGULAIR) 10 MG tablet, montelukast 10 mg tablet, Disp: , Rfl:   •  Multiple Vitamins-Minerals (MULTIVITAMIN WITH MINERALS) tablet tablet, Take 2 tablets by mouth Daily., Disp: , Rfl:   •  REXULTI 0.5 MG tablet, Take 1 tablet by mouth Daily., Disp: , Rfl:   •  RODEX FORTE, Take 1 capsule by mouth Daily. Take one to two capsules daily., Disp: , Rfl:   •  Vortioxetine HBr (Trintellix) 10 MG tablet, , Disp: , Rfl:   •  cyanocobalamin 1000 MCG/ML injection, Inject 1 mL into the appropriate muscle as directed by prescriber Every 14 (Fourteen) Days., Disp: 10 mL, Rfl: 0  No current facility-administered medications for this visit.     Physical Exam:    General:  .well developed; well nourished  no acute distress  obese      ASSESSMENT/PLAN:   Diagnoses and all orders for this visit:    Insulin resistance  -     cyanocobalamin 1000 MCG/ML injection; Inject 1 mL into the appropriate muscle as directed by prescriber Every 14 (Fourteen) Days.    Obesity, Class III, BMI 40-49.9 (morbid obesity) (CMS/[facility])  -     Liraglutide (Victoza) 18 MG/3ML solution pen-injector injection; Inject 1.8 mg under the skin into the appropriate area as directed Daily. Total daily dose 2.4mg (1.8mg+0.6mg)    PCOS (polycystic ovarian syndrome)  -     metFORMIN ER (GLUCOPHAGE-XR) 500 MG 24 hr tablet; Take one tablet by mouth in the morning and two by mouth with evening meal.    Dysmetabolic syndrome  -     metFORMIN ER (GLUCOPHAGE-XR)  500 MG 24 hr tablet; Take one tablet by mouth in the morning and two by mouth with evening meal.    I have instructed the patient to continue with pursuit of medical weight loss as a part of this program. Patient does meet criteria for use of anorectics at this time as BMI >27. Continue nutritional focus and work towards new exercise FITT goal of:     Frequency   Intensity Time Strength Training   []   0 None  []   0 None  []   0 None  []   0 None    []   1 (1-2x/week) [x]   1 (light) []   1 (<10 min) []   1 (1x/week)   []   2 (3-5x/week) []   2 (moderate) []   2 (10-20 min) []   2 (2x/week)   [x]   3 (daily)   []   3 (moderately hard)  []   4 (very hard) [x]   3 (20-30 min)  []   4 (>30 min) [x]   3 (3-4x/week)       The current plan for this month includes: Get back on track with food journal, meal planning, increase water and decrease soft drinks and sweet tea. Add dAILY movement and exercise.       Note: This was an audio and video enabled telemedicine encounter to comply with social distancing guidelines during the COVID-19 pandemic.  Consent was obtained prior to the visit. Refills of controlled substances are being provided in this context only because of the state of emergency and current social distancing mandates due to COVID-19. We will resume face-to-face visits as permitted and as advised by public health officials.     Plan of care reviewed with the patient at the conclusion of today's visit. We discussed the risks, benefits, and limitations of treatments. Continue medications and OTC supplements as discussed. Patient verbalizes understanding of and agreement with management plan.      Return in about 1 month (around 6/19/2020) for Next scheduled follow up.      ZARINA Barraza

## 2020-08-04 DIAGNOSIS — E88.81 INSULIN RESISTANCE: ICD-10-CM

## 2020-08-04 RX ORDER — CYANOCOBALAMIN 1000 UG/ML
1000 INJECTION, SOLUTION INTRAMUSCULAR; SUBCUTANEOUS
Qty: 6 ML | Refills: 1 | Status: SHIPPED | OUTPATIENT
Start: 2020-08-04 | End: 2021-01-19

## 2020-08-30 DIAGNOSIS — E28.2 PCOS (POLYCYSTIC OVARIAN SYNDROME): ICD-10-CM

## 2020-08-30 DIAGNOSIS — E88.81 DYSMETABOLIC SYNDROME: ICD-10-CM

## 2020-08-31 RX ORDER — METFORMIN HYDROCHLORIDE 500 MG/1
TABLET, EXTENDED RELEASE ORAL
Qty: 270 TABLET | Refills: 0 | Status: SHIPPED | OUTPATIENT
Start: 2020-08-31 | End: 2020-09-03 | Stop reason: SDUPTHER

## 2020-09-03 ENCOUNTER — TELEMEDICINE (OUTPATIENT)
Dept: BARIATRICS/WEIGHT MGMT | Facility: CLINIC | Age: 45
End: 2020-09-03

## 2020-09-03 VITALS
DIASTOLIC BLOOD PRESSURE: 99 MMHG | HEIGHT: 62 IN | SYSTOLIC BLOOD PRESSURE: 149 MMHG | HEART RATE: 106 BPM | WEIGHT: 242 LBS | BODY MASS INDEX: 44.53 KG/M2

## 2020-09-03 DIAGNOSIS — E78.5 HYPERLIPIDEMIA, UNSPECIFIED HYPERLIPIDEMIA TYPE: Primary | ICD-10-CM

## 2020-09-03 DIAGNOSIS — E88.81 DYSMETABOLIC SYNDROME: ICD-10-CM

## 2020-09-03 DIAGNOSIS — K21.9 GASTROESOPHAGEAL REFLUX DISEASE, ESOPHAGITIS PRESENCE NOT SPECIFIED: ICD-10-CM

## 2020-09-03 DIAGNOSIS — G47.33 OSA (OBSTRUCTIVE SLEEP APNEA): ICD-10-CM

## 2020-09-03 DIAGNOSIS — E28.2 PCOS (POLYCYSTIC OVARIAN SYNDROME): ICD-10-CM

## 2020-09-03 DIAGNOSIS — E66.01 OBESITY, CLASS III, BMI 40-49.9 (MORBID OBESITY) (HCC): ICD-10-CM

## 2020-09-03 PROCEDURE — MEDWTESTPT: Performed by: NURSE PRACTITIONER

## 2020-09-03 RX ORDER — BUPROPION HYDROCHLORIDE 100 MG/1
100 TABLET, EXTENDED RELEASE ORAL DAILY
COMMUNITY
Start: 2020-06-29 | End: 2021-07-27 | Stop reason: ALTCHOICE

## 2020-09-03 RX ORDER — METFORMIN HYDROCHLORIDE 500 MG/1
TABLET, EXTENDED RELEASE ORAL
Qty: 270 TABLET | Refills: 0 | Status: SHIPPED | OUTPATIENT
Start: 2020-09-03 | End: 2020-12-08 | Stop reason: SDUPTHER

## 2020-09-03 RX ORDER — CARIPRAZINE 1.5 MG/1
1.5 CAPSULE, GELATIN COATED ORAL DAILY
COMMUNITY
Start: 2020-08-17 | End: 2022-08-15

## 2020-09-03 NOTE — PROGRESS NOTES
"[facility]   [address]  [place] [address]    Name: Princess Bailey  : 1975  Patient Care Team:  Dre Ann MD as PCP - General (Family Medicine)    Chief Complaint;:   Chief Complaint   Patient presents with   • Follow-up   • Obesity        HPI      Virtual Visit today due to COVID-19 pandemic and current guidelines for physical distancing.     Virtual visit for Princess Bailey, a 45 y.o. female, established patient with extensive medical history including class 3 obesity, hyperlipidemia, GERD, JELANI, for medical weight loss. Patient is satisfied with weight loss progress. The patient is exercising. The patient is not using a food journal. The patient is not checking weight regularly. Body mass index is 44.26 kg/m². Daughter moved to college and it has been hard for her, has incorporated yoga. Also seeing therapist once a week who is counseling on exercise and eating behaviors and making healthy choices and \"Thinking about what I want to be\". Off of rexulti and on vrylar and is much less lethargic. Stopped victoza for a little while, was having stomach issues, restarted by titraing back up. Up to 1.2mg and tolerating well.     The patient is exercising with a FITT score of:    Frequency   Intensity Time Strength Training   []   0 None  []   0 None  []   0 None  [x]   0 None    [x]   1 (1-2x/week) [x]   1 (light) []   1 (<10 min) []   1 (1x/week)   []   2 (3-5x/week) []   2 (moderate) []   2 (10-20 min) []   2 (2x/week)   []   3 (daily)   []   3 (moderately hard)  []   4 (very hard) [x]   3 (20-30 min)  []   4 (>30 min) []   3 (3-4x/week)       Change in weight since last visit: +3lb    Recent Weight History:   Wt Readings from Last 3 Encounters:   20 110 kg (242 lb)   20 108 kg (239 lb)   20 107 kg (235 lb 8 oz)     Start Weight: 239lb  Total Loss/%Loss of BBW: +3lb    VS   Vitals:    20 0834   BP: 149/99   Pulse: 106   Weight: 110 " "kg (242 lb)   Height: 157.5 cm (62\")       Measurements (in inches)  Waist:     Past Medical History:   Diagnosis Date   • Anxiety    • Asthma    • Depression    • Fibromyalgia    • Hypertension    • Hypothyroid    • JELANI (obstructive sleep apnea)    • PCOS (polycystic ovarian syndrome)    • PID (pelvic inflammatory disease)        Patient Active Problem List   Diagnosis   • Hyperlipidemia   • Dysmetabolic syndrome   • Abnormal LFTs   • Iron deficiency anemia   • Hyperthyroidism   • PCOS (polycystic ovarian syndrome)   • Depression   • JELANI (obstructive sleep apnea)   • Asthma   • GERD (gastroesophageal reflux disease)   • Back pain   • Interstitial cystitis   • ADD (attention deficit disorder)   • Attention deficit hyperactivity disorder   • Stage 1 chronic kidney disease   • Body mass index (BMI) of 40.0-44.9 in adult (CMS/HCC)   • Carpal tunnel syndrome   • Degeneration of lumbosacral intervertebral disc   • Depressed bipolar I disorder (CMS/HCC)       Allergies   Allergen Reactions   • Phenergan  [Promethazine Hcl] Anaphylaxis   • Contrast Dye Unknown (See Comments)     Was told from allergy testing   • Ibuprofen Itching   • Toradol [Ketorolac Tromethamine] Itching         Current Outpatient Medications:   •  ALPRAZolam (XANAX) 0.5 MG tablet, , Disp: , Rfl:   •  buPROPion SR (WELLBUTRIN SR) 100 MG 12 hr tablet, Take 100 mg by mouth Daily., Disp: , Rfl:   •  Cholecalciferol (VITAMIN D3) 5000 units capsule capsule, Daily., Disp: , Rfl:   •  cloNIDine (CATAPRES) 0.1 MG tablet, Every 12 (Twelve) Hours., Disp: , Rfl:   •  cyanocobalamin 1000 MCG/ML injection, INJECT 1 ML INTO THE APPROPRIATE MUSCLE AS DIRECTED BY PRESCRIBER EVERY 14 (FOURTEEN) DAYS., Disp: 6 mL, Rfl: 1  •  cyclobenzaprine (FLEXERIL) 10 MG tablet, 1 tab(s), Disp: , Rfl:   •  Elastic Bandages & Supports (WRIST BRACE/LEFT MEDIUM) misc, -, Disp: , Rfl:   •  esomeprazole (nexIUM) 40 MG capsule, Take 40 mg by mouth Daily., Disp: , Rfl: 11  •  fexofenadine " (ALLEGRA ALLERGY) 180 MG tablet, Allegra 180 mg tablet  Daily, Disp: , Rfl:   •  hydrOXYzine pamoate (VISTARIL) 25 MG capsule, Take 25 mg by mouth 3 (Three) Times a Day As Needed., Disp: , Rfl: 0  •  Insulin Pen Needle 32G X 4 MM misc, Use with victoza injection pen., Disp: 100 each, Rfl: 1  •  levothyroxine (SYNTHROID) 75 MCG tablet, Daily., Disp: , Rfl:   •  Liraglutide (Victoza) 18 MG/3ML solution pen-injector injection, Inject 1.8 mg under the skin into the appropriate area as directed Daily. Total daily dose 2.4mg (1.8mg+0.6mg), Disp: 6 pen, Rfl: 2  •  lisdexamfetamine (VYVANSE) 50 MG capsule, 1 cap(s), Disp: , Rfl:   •  metFORMIN ER (GLUCOPHAGE-XR) 500 MG 24 hr tablet, Take one tablet in the morning and two tablets at night., Disp: 270 tablet, Rfl: 0  •  montelukast (SINGULAIR) 10 MG tablet, montelukast 10 mg tablet, Disp: , Rfl:   •  Multiple Vitamins-Minerals (MULTIVITAMIN WITH MINERALS) tablet tablet, Take 2 tablets by mouth Daily., Disp: , Rfl:   •  RODEX FORTE, Take 1 capsule by mouth Daily. Take one to two capsules daily., Disp: , Rfl:   •  Vortioxetine HBr (Trintellix) 10 MG tablet, , Disp: , Rfl:   •  VRAYLAR 1.5 MG capsule capsule, Take 1.5 mg by mouth Daily., Disp: , Rfl:     Physical Exam:    General:  .well developed; well nourished  no acute distress  obese      ASSESSMENT/PLAN:   Diagnoses and all orders for this visit:    1. Hyperlipidemia, unspecified hyperlipidemia type (Primary)    2. Obesity, Class III, BMI 40-49.9 (morbid obesity) (CMS/[facility])  -     Liraglutide (Victoza) 18 MG/3ML solution pen-injector injection; Inject 1.8 mg under the skin into the appropriate area as directed Daily. Total daily dose 2.4mg (1.8mg+0.6mg)  Dispense: 6 pen; Refill: 2  -     Insulin Pen Needle 32G X 4 MM misc; Use with victoza injection pen.  Dispense: 100 each; Refill: 1    3. PCOS (polycystic ovarian syndrome)  -     metFORMIN ER (GLUCOPHAGE-XR) 500 MG 24 hr tablet; Take one tablet in the morning and two  tablets at night.  Dispense: 270 tablet; Refill: 0    4. Dysmetabolic syndrome  -     metFORMIN ER (GLUCOPHAGE-XR) 500 MG 24 hr tablet; Take one tablet in the morning and two tablets at night.  Dispense: 270 tablet; Refill: 0  -     Insulin Pen Needle 32G X 4 MM misc; Use with victoza injection pen.  Dispense: 100 each; Refill: 1    5. JELANI (obstructive sleep apnea)    6. Gastroesophageal reflux disease, esophagitis presence not specified    I have instructed the patient to continue with pursuit of medical weight loss as a part of this program. Patient does meet criteria for use of anorectics at this time as BMI >27. Continue nutritional focus and work towards new exercise FITT goal of:     Frequency   Intensity Time Strength Training   []   0 None  []   0 None  []   0 None  []   0 None    []   1 (1-2x/week) [x]   1 (light) []   1 (<10 min) []   1 (1x/week)   [x]   2 (3-5x/week) []   2 (moderate) []   2 (10-20 min) [x]   2 (2x/week)   []   3 (daily)   []   3 (moderately hard)  []   4 (very hard) [x]   3 (20-30 min)  []   4 (>30 min) []   3 (3-4x/week)       The current plan for this month includes: Send me labs and vital signs. Goal 125-150g/day (initial goal), protein 80-100g/day, 1350 calories. Consider mediterranean diet.     Note: This was an audio and video enabled telemedicine encounter to comply with physical distancing guidelines during the COVID-19 pandemic.  Consent for televisit was obtained prior to the visit. Refills of controlled substances are being provided in this context because of the state of emergency and current social distancing guidance due to COVID-19. We will resume face-to-face visits as permitted and as advised by public health officials.     Plan of care reviewed with the patient at the conclusion of today's visit. We discussed the risks, benefits, and limitations of treatments. Continue medications and OTC supplements as discussed. Patient verbalizes understanding of and agreement with  management plan.      Return in about 1 month (around 10/3/2020) for Next scheduled follow up.      ZARINA Barraza

## 2020-10-26 ENCOUNTER — OFFICE VISIT (OUTPATIENT)
Dept: BARIATRICS/WEIGHT MGMT | Facility: CLINIC | Age: 45
End: 2020-10-26

## 2020-10-26 VITALS
TEMPERATURE: 97.8 F | DIASTOLIC BLOOD PRESSURE: 78 MMHG | HEART RATE: 102 BPM | WEIGHT: 245.5 LBS | RESPIRATION RATE: 18 BRPM | HEIGHT: 62 IN | OXYGEN SATURATION: 99 % | SYSTOLIC BLOOD PRESSURE: 138 MMHG | BODY MASS INDEX: 45.18 KG/M2

## 2020-10-26 DIAGNOSIS — Z86.39 HISTORY OF VITAMIN D DEFICIENCY: ICD-10-CM

## 2020-10-26 DIAGNOSIS — E66.01 OBESITY, CLASS III, BMI 40-49.9 (MORBID OBESITY) (HCC): ICD-10-CM

## 2020-10-26 DIAGNOSIS — E03.9 HYPOTHYROIDISM, UNSPECIFIED TYPE: ICD-10-CM

## 2020-10-26 DIAGNOSIS — R53.83 FATIGUE, UNSPECIFIED TYPE: Primary | ICD-10-CM

## 2020-10-26 PROCEDURE — MEDWTESTPT: Performed by: NURSE PRACTITIONER

## 2020-10-26 RX ORDER — HYDROCHLOROTHIAZIDE 25 MG/1
25 TABLET ORAL DAILY
COMMUNITY
Start: 2020-10-07

## 2020-10-26 NOTE — PROGRESS NOTES
"[facility]  [address]  [place] [address]    Name: Princess Bailey  : 1975    Chief Complaint   Patient presents with   • Follow-up   • Nutrition Counseling   • Weight Loss        /78 (BP Location: Left arm, Patient Position: Sitting, Cuff Size: Adult)   Pulse 102   Temp 97.8 °F (36.6 °C) (Temporal)   Resp 18   Ht 157.5 cm (62\")   Wt 111 kg (245 lb 8 oz)   SpO2 99%   BMI 44.90 kg/m²       Allergies   Allergen Reactions   • Phenergan  [Promethazine Hcl] Anaphylaxis   • Contrast Dye Unknown (See Comments)     Was told from allergy testing   • Ibuprofen Itching   • Toradol [Ketorolac Tromethamine] Itching       Vitals:    10/26/20 0836   BP: 138/78   BP Location: Left arm   Patient Position: Sitting   Cuff Size: Adult   Pulse: 102   Resp: 18   Temp: 97.8 °F (36.6 °C)   TempSrc: Temporal   SpO2: 99%   Weight: 111 kg (245 lb 8 oz)   Height: 157.5 cm (62\")       Current Outpatient Medications on File Prior to Visit   Medication Sig Dispense Refill   • ALPRAZolam (XANAX) 0.5 MG tablet      • buPROPion SR (WELLBUTRIN SR) 100 MG 12 hr tablet Take 100 mg by mouth Daily.     • Cholecalciferol (VITAMIN D3) 5000 units capsule capsule Daily.     • cloNIDine (CATAPRES) 0.1 MG tablet Every 12 (Twelve) Hours.     • cyanocobalamin 1000 MCG/ML injection INJECT 1 ML INTO THE APPROPRIATE MUSCLE AS DIRECTED BY PRESCRIBER EVERY 14 (FOURTEEN) DAYS. 6 mL 1   • cyclobenzaprine (FLEXERIL) 10 MG tablet 1 tab(s)     • esomeprazole (nexIUM) 40 MG capsule Take 40 mg by mouth Daily.  11   • fexofenadine (ALLEGRA ALLERGY) 180 MG tablet Allegra 180 mg tablet   Daily     • hydroCHLOROthiazide (HYDRODIURIL) 25 MG tablet Take 25 mg by mouth Daily.     • hydrOXYzine pamoate (VISTARIL) 25 MG capsule Take 25 mg by mouth 3 (Three) Times a Day As Needed.  0   • Insulin Pen Needle 32G X 4 MM misc Use with victoza injection pen. 100 each 1   • levothyroxine (SYNTHROID) 75 MCG " tablet Daily.     • Liraglutide (Victoza) 18 MG/3ML solution pen-injector injection Inject 1.8 mg under the skin into the appropriate area as directed Daily. Total daily dose 2.4mg (1.8mg+0.6mg) 6 pen 2   • lisdexamfetamine (VYVANSE) 50 MG capsule 1 cap(s)     • metFORMIN ER (GLUCOPHAGE-XR) 500 MG 24 hr tablet Take one tablet in the morning and two tablets at night. 270 tablet 0   • montelukast (SINGULAIR) 10 MG tablet montelukast 10 mg tablet     • Multiple Vitamins-Minerals (MULTIVITAMIN WITH MINERALS) tablet tablet Take 2 tablets by mouth Daily.     • RODEX FORTE Take 1 capsule by mouth Daily. Take one to two capsules daily.     • Vortioxetine HBr (Trintellix) 10 MG tablet      • VRAYLAR 1.5 MG capsule capsule Take 1.5 mg by mouth Daily.     • [DISCONTINUED] Elastic Bandages & Supports (WRIST BRACE/LEFT MEDIUM) misc -       No current facility-administered medications on file prior to visit.        Recent Weight History:   Wt Readings from Last 6 Encounters:   10/26/20 111 kg (245 lb 8 oz)   09/03/20 110 kg (242 lb)   05/19/20 108 kg (239 lb)   02/27/20 107 kg (235 lb 8 oz)   10/31/19 103 kg (226 lb)   09/03/19 102 kg (224 lb)       Change in weight since last visit: +10 lb gain  Start Weight: 238  Total Loss%: +3.15 %  Loss of BBW: +7.5 lb gain    Body composition analysis completed and showed:  %body fat: 53.0  Total fat mass (in lbs): 130.0  Fat free mass (in lbs): 115.5  Total body water (in lb): 84.5  BMR: 1796     Measurements (in inches)  Waist: 51.25    The patient is exercising with a FITT score of:    Frequency   Intensity Time Strength Training   []   0 None  []   0 None  []   0 None  [x]   0 None    [x]   1 (1-2x/week) [x]   1 (light) []   1 (<10 min) []   1 (1x/week)   []   2 (3-5x/week) []   2 (moderate) [x]   2 (10-20 min) []   2 (2x/week)   []   3 (daily)   []   3 (moderately hard)  []   4 (very hard) []   3 (20-30 min)  []   4 (>30 min) []   3 (3-4x/week)       HPI      Office visit for Princess  Leo, a 45 y.o. female, established patient with extensive medical history including class 3 obesity, hyperlipidemia, GERD, JELANI, for medical weight loss. Patient is unsatisfied with weight loss progress and has concerns about. There were no unexpected side effects.. The medication changes were There were no changes in medical or surgical history. Hunger control has remain unchanged The patient is exercising. The patient is not using a food journal. The patient is checking weight regularly. Body mass index is 44.9 kg/m².     -Drinking 1 16 oz bottle day water.  This is good for her.  She is working with a food therapist for making small changes she is currently drinking large quantities of sweet tea and working on changing this from regular sugar to a natural reduced calorie sweetener.  -Energy is low, last labs did not include T3, T4 would like these drawn as well looking at vit D and anemia. Did not bring the last labs as requested.  --Has not been tracking food work calories she is eating for day.  Has used my fitness pal in the past but this does not become a habit for her.  --She currently does not require a refill on the Metformin or the Victoza.    Review of Systems:  Review of Systems   Constitutional: Positive for fatigue. Negative for activity change (decreased due to fatigue) and appetite change (decreased).   HENT: Negative.    Eyes: Negative.    Respiratory: Negative.    Cardiovascular: Negative.    Gastrointestinal: Negative.    Endocrine: Positive for heat intolerance and polyphagia.       Physical Exam:  Physical Exam  Constitutional:       Appearance: She is well-developed. She is obese. She is not ill-appearing.   HENT:      Head: Normocephalic and atraumatic.   Neck:      Thyroid: No thyroid mass, thyromegaly or thyroid tenderness.   Cardiovascular:      Rate and Rhythm: Regular rhythm.      Heart sounds: Normal heart sounds.   Pulmonary:      Effort: Pulmonary effort is normal.      Breath  sounds: No wheezing, rhonchi or rales.   Neurological:      Mental Status: She is alert.   Psychiatric:         Attention and Perception: Attention normal.         Mood and Affect: Mood normal.         Speech: Speech normal.         Behavior: Behavior normal.          ASSESSMENT/PLAN:   Diagnoses and all orders for this visit:    1. Fatigue, unspecified type (Primary)  -     Cancel: T3, Free  -     Cancel: T4, Free  -     Cancel: TSH  -     Cancel: Vitamin D 25 Hydroxy  -     Cancel: Comprehensive Metabolic Panel  -     Cancel: Comprehensive Metabolic Panel; Future  -     Cancel: T3, Free; Future  -     Cancel: T4, Free; Future  -     Cancel: TSH; Future  -     Cancel: Vitamin D 25 Hydroxy; Future  -     Cancel: Vitamin D 25 Hydroxy; Future  -     Vitamin D 25 Hydroxy; Future  -     TSH; Future  -     T4, Free; Future  -     T3, Free; Future  -     Comprehensive Metabolic Panel; Future    2. Hypothyroidism, unspecified type    3. Obesity, Class III, BMI 40-49.9 (morbid obesity) (CMS/HCC)    4. History of vitamin D deficiency        I have instructed the patient to continue with pursuit of medical weight loss as a part of this program.Continue nutritional focus and work towards new exercise FITT goal of:     Frequency   Intensity Time Strength Training   []   0 None  []   0 None  []   0 None  [x]   0 None    []   1 (1-2x/week) []   1 (light) []   1 (<10 min) []   1 (1x/week)   [x]   2 (3-5x/week) [x]   2 (moderate) []   2 (10-20 min) []   2 (2x/week)   []   3 (daily)   []   3 (moderately hard)  []   4 (very hard) []   3 (20-30 min)  [x]   4 (>30 min) []   3 (3-4x/week)       The current plan for this month includes:   --Restart food journaling.  She is going to send the first week to me through my chart for review.  --Restart exercise a minimum of 3 days a week for 30 minutes.  She uses the [facility] facility.  Discussed the positive effect of exercise on depression and fatigue in the long run.  --Continue  efforts to increase water and decrease sweet tea.  --Labs drawn today in office.  Most recent labs from [facility] were requested by MA.  --Set macros in my fitness pal to include carbohydrates 35%, protein 35% and fat 30%.  Discussed the eventual goal of setting carbohydrates down to 30%.    I spent 20 minutes face to face with patient and 20 minutes were spent counseling the patient on obesity and behavior change.     Plan of care reviewed with the patient at the conclusion of today's visit. We discussed the risks, benefits, and limitations of treatments. Continue medications and OTC supplements as discussed. Patient verbalizes understanding of and agreement with management plan.          Return in about 1 month (around 11/26/2020).      ZARINA Karimi

## 2020-10-27 LAB
25(OH)D3+25(OH)D2 SERPL-MCNC: 61.1 NG/ML (ref 30–100)
T3FREE SERPL-MCNC: 3.3 PG/ML (ref 2–4.4)
T4 FREE SERPL-MCNC: 1.2 NG/DL (ref 0.93–1.7)
TSH SERPL DL<=0.005 MIU/L-ACNC: 1.78 UIU/ML (ref 0.27–4.2)

## 2020-10-31 ENCOUNTER — RESULTS ENCOUNTER (OUTPATIENT)
Dept: BARIATRICS/WEIGHT MGMT | Facility: CLINIC | Age: 45
End: 2020-10-31

## 2020-10-31 DIAGNOSIS — R53.83 FATIGUE, UNSPECIFIED TYPE: ICD-10-CM

## 2020-12-08 ENCOUNTER — TELEMEDICINE (OUTPATIENT)
Dept: BARIATRICS/WEIGHT MGMT | Facility: CLINIC | Age: 45
End: 2020-12-08

## 2020-12-08 VITALS
HEART RATE: 104 BPM | SYSTOLIC BLOOD PRESSURE: 127 MMHG | BODY MASS INDEX: 44.31 KG/M2 | WEIGHT: 240.8 LBS | DIASTOLIC BLOOD PRESSURE: 90 MMHG | HEIGHT: 62 IN

## 2020-12-08 DIAGNOSIS — E88.81 DYSMETABOLIC SYNDROME: ICD-10-CM

## 2020-12-08 DIAGNOSIS — E28.2 PCOS (POLYCYSTIC OVARIAN SYNDROME): ICD-10-CM

## 2020-12-08 PROBLEM — E78.2 MIXED HYPERLIPIDEMIA: Status: ACTIVE | Noted: 2019-01-04

## 2020-12-08 PROCEDURE — MEDWTESTPT: Performed by: NURSE PRACTITIONER

## 2020-12-08 RX ORDER — METFORMIN HYDROCHLORIDE 500 MG/1
TABLET, EXTENDED RELEASE ORAL
Qty: 270 TABLET | Refills: 0 | Status: SHIPPED | OUTPATIENT
Start: 2020-12-08 | End: 2021-02-02 | Stop reason: SDUPTHER

## 2020-12-08 NOTE — PROGRESS NOTES
"[facility]   [address]  [place] [address]    Name: Princess Bailey  : 1975  Patient Care Team:  Dre Ann MD as PCP - General (Family Medicine)    Chief Complaint;:   Chief Complaint   Patient presents with   • Follow-up   • Obesity        HPI      Virtual Visit today due to COVID-19 pandemic and current guidelines for physical distancing.     Virtual visit for Princess Bailey, a 45 y.o. female, established patient with class 3 obesity, for medical weight loss.  Body mass index is 44.04 kg/m².. Heart rate has been elevated, working with PCP, possibly clonidine causing rebound hypertension. BP was running even higher (140s/100s) prior to starting hctz. Then had COVID-19, still very fatigued and cant taste a lot of things which has been helpful to keep her focused on healthy food choices. Cooking at home mostly. Not using food journal. No exercise right now, minimal exertion causes \"coughing fit\".     The patient is exercising with a FITT score of:    Frequency   Intensity Time Strength Training   [x]   0 None  [x]   0 None  [x]   0 None  [x]   0 None    []   1 (1-2x/week) []   1 (light) []   1 (<10 min) []   1 (1x/week)   []   2 (3-5x/week) []   2 (moderate) []   2 (10-20 min) []   2 (2x/week)   []   3 (daily)   []   3 (moderately hard)  []   4 (very hard) []   3 (20-30 min)  []   4 (>30 min) []   3 (3-4x/week)       Change in weight since last visit: -5lb    Recent Weight History:   Wt Readings from Last 3 Encounters:   20 109 kg (240 lb 12.8 oz)   10/26/20 111 kg (245 lb 8 oz)   20 110 kg (242 lb)     Start Weight: 239lb  Total Loss/%Loss of BBW: +1lb    VS   Vitals:    20 0845   BP: 127/90   Pulse: 104   Weight: 109 kg (240 lb 12.8 oz)   Height: 157.5 cm (62\")     Measurements (in inches)  Waist:     Past Medical History:   Diagnosis Date   • Anxiety    • Asthma    • Depression    • Fibromyalgia    • Hypertension    • Hypothyroid  "   • JELANI (obstructive sleep apnea)    • PCOS (polycystic ovarian syndrome)    • PID (pelvic inflammatory disease)        Patient Active Problem List   Diagnosis   • Mixed hyperlipidemia   • Dysmetabolic syndrome   • Abnormal LFTs   • Iron deficiency anemia   • PCOS (polycystic ovarian syndrome)   • Depression   • JELANI (obstructive sleep apnea)   • Asthma without status asthmaticus   • Gastro-esophageal reflux disease without esophagitis   • Back pain   • Interstitial cystitis   • ADD (attention deficit disorder)   • Attention deficit hyperactivity disorder   • Stage 1 chronic kidney disease   • Body mass index (BMI)40.0-44.9, adult (CMS/[facility])   • Carpal tunnel syndrome   • Degeneration of lumbosacral intervertebral disc   • Depressed bipolar I disorder (CMS/[facility])       Allergies   Allergen Reactions   • Phenergan  [Promethazine Hcl] Anaphylaxis   • Contrast Dye Unknown (See Comments)     Was told from allergy testing   • Ibuprofen Itching   • Toradol [Ketorolac Tromethamine] Itching         Current Outpatient Medications:   •  ALPRAZolam (XANAX) 0.5 MG tablet, , Disp: , Rfl:   •  buPROPion SR (WELLBUTRIN SR) 100 MG 12 hr tablet, Take 100 mg by mouth Daily., Disp: , Rfl:   •  Cholecalciferol (VITAMIN D3) 5000 units capsule capsule, Daily., Disp: , Rfl:   •  cloNIDine (CATAPRES) 0.1 MG tablet, Every 12 (Twelve) Hours., Disp: , Rfl:   •  cyanocobalamin 1000 MCG/ML injection, INJECT 1 ML INTO THE APPROPRIATE MUSCLE AS DIRECTED BY PRESCRIBER EVERY 14 (FOURTEEN) DAYS., Disp: 6 mL, Rfl: 1  •  cyclobenzaprine (FLEXERIL) 10 MG tablet, 1 tab(s), Disp: , Rfl:   •  esomeprazole (nexIUM) 40 MG capsule, Take 40 mg by mouth Daily., Disp: , Rfl: 11  •  fexofenadine (ALLEGRA ALLERGY) 180 MG tablet, Allegra 180 mg tablet  Daily, Disp: , Rfl:   •  hydroCHLOROthiazide (HYDRODIURIL) 25 MG tablet, Take 25 mg by mouth Daily., Disp: , Rfl:   •  hydrOXYzine pamoate (VISTARIL) 25 MG capsule, Take 25 mg by mouth 3 (Three) Times a Day As Needed.,  Disp: , Rfl: 0  •  Insulin Pen Needle 32G X 4 MM misc, Use with victoza injection pen., Disp: 100 each, Rfl: 1  •  levothyroxine (SYNTHROID) 75 MCG tablet, Daily., Disp: , Rfl:   •  Liraglutide (Victoza) 18 MG/3ML solution pen-injector injection, Inject 1.8 mg under the skin into the appropriate area as directed Daily. Total daily dose 2.4mg (1.8mg+0.6mg), Disp: 6 pen, Rfl: 2  •  lisdexamfetamine (VYVANSE) 50 MG capsule, 1 cap(s), Disp: , Rfl:   •  metFORMIN ER (GLUCOPHAGE-XR) 500 MG 24 hr tablet, Take one tablet in the morning and two tablets at night., Disp: 270 tablet, Rfl: 0  •  montelukast (SINGULAIR) 10 MG tablet, montelukast 10 mg tablet, Disp: , Rfl:   •  Multiple Vitamins-Minerals (MULTIVITAMIN WITH MINERALS) tablet tablet, Take 2 tablets by mouth Daily., Disp: , Rfl:   •  RODEX FORTE, Take 1 capsule by mouth Daily. Take one to two capsules daily., Disp: , Rfl:   •  Vortioxetine HBr (Trintellix) 10 MG tablet, , Disp: , Rfl:   •  VRAYLAR 1.5 MG capsule capsule, Take 1.5 mg by mouth Daily., Disp: , Rfl:     Physical Exam:    Physical Exam  Constitutional:       Appearance: Normal appearance. She is obese.   Eyes:      General: No scleral icterus.  Pulmonary:      Comments: Cough  Psychiatric:         Mood and Affect: Mood normal.      ASSESSMENT/PLAN:   Diagnoses and all orders for this visit:    1. PCOS (polycystic ovarian syndrome)  -     metFORMIN ER (GLUCOPHAGE-XR) 500 MG 24 hr tablet; Take one tablet in the morning and two tablets at night.  Dispense: 270 tablet; Refill: 0    2. Dysmetabolic syndrome  -     metFORMIN ER (GLUCOPHAGE-XR) 500 MG 24 hr tablet; Take one tablet in the morning and two tablets at night.  Dispense: 270 tablet; Refill: 0    I have instructed the patient to continue with pursuit of medical weight loss as a part of this program. Patient does meet criteria for use of anorectics at this time as BMI >27. Continue nutritional focus and work towards new exercise FITT goal of:      Frequency   Intensity Time Strength Training   []   0 None  []   0 None  []   0 None  []   0 None    [x]   1 (1-2x/week) [x]   1 (light) [x]   1 (<10 min) []   1 (1x/week)   []   2 (3-5x/week) []   2 (moderate) []   2 (10-20 min) [x]   2 (2x/week)   []   3 (daily)   []   3 (moderately hard)  []   4 (very hard) []   3 (20-30 min)  []   4 (>30 min) []   3 (3-4x/week)       The current plan for this month includes: Continue with water intake, goal to maintain health. Continue to cook at home. Bring back exercise as tolerated. Goal 5-7lb.     Note: This was an audio and video enabled telemedicine encounter to comply with physical distancing guidelines during the COVID-19 pandemic.  Consent for televisit was obtained prior to the visit. Refills of controlled substances are being provided in this context because of the state of emergency and current social distancing guidance due to COVID-19. We will resume face-to-face visits as permitted and as advised by public health officials.     Plan of care reviewed with the patient at the conclusion of today's visit. We discussed the risks, benefits, and limitations of treatments. Continue medications and OTC supplements as discussed. Patient verbalizes understanding of and agreement with management plan.      Return in about 1 month (around 1/8/2021) for Next scheduled follow up.      ZARINA Barraza

## 2021-01-17 DIAGNOSIS — E88.81 INSULIN RESISTANCE: ICD-10-CM

## 2021-01-19 RX ORDER — CYANOCOBALAMIN 1000 UG/ML
1000 INJECTION, SOLUTION INTRAMUSCULAR; SUBCUTANEOUS
Qty: 6 ML | Refills: 1 | Status: SHIPPED | OUTPATIENT
Start: 2021-01-19 | End: 2022-08-15

## 2021-02-02 ENCOUNTER — OFFICE VISIT (OUTPATIENT)
Dept: BARIATRICS/WEIGHT MGMT | Facility: CLINIC | Age: 46
End: 2021-02-02

## 2021-02-02 VITALS
TEMPERATURE: 97.3 F | RESPIRATION RATE: 16 BRPM | HEART RATE: 117 BPM | DIASTOLIC BLOOD PRESSURE: 107 MMHG | OXYGEN SATURATION: 98 % | SYSTOLIC BLOOD PRESSURE: 165 MMHG | BODY MASS INDEX: 45.08 KG/M2 | HEIGHT: 62 IN | WEIGHT: 245 LBS

## 2021-02-02 DIAGNOSIS — E66.01 OBESITY, CLASS III, BMI 40-49.9 (MORBID OBESITY) (HCC): ICD-10-CM

## 2021-02-02 DIAGNOSIS — E28.2 PCOS (POLYCYSTIC OVARIAN SYNDROME): ICD-10-CM

## 2021-02-02 DIAGNOSIS — E88.81 DYSMETABOLIC SYNDROME: ICD-10-CM

## 2021-02-02 PROCEDURE — MEDWTESTPT: Performed by: NURSE PRACTITIONER

## 2021-02-02 RX ORDER — LIRAGLUTIDE 6 MG/ML
1.8 INJECTION SUBCUTANEOUS DAILY
Qty: 6 PEN | Refills: 2 | Status: SHIPPED | OUTPATIENT
Start: 2021-02-02 | End: 2021-04-20 | Stop reason: SINTOL

## 2021-02-02 RX ORDER — METFORMIN HYDROCHLORIDE 500 MG/1
TABLET, EXTENDED RELEASE ORAL
Qty: 270 TABLET | Refills: 0 | Status: SHIPPED | OUTPATIENT
Start: 2021-02-02 | End: 2021-06-22

## 2021-02-02 NOTE — PROGRESS NOTES
[facility]   [address]  [place] [address]    Name: Princess Bailey  : 1975  Patient Care Team:  Dre Ann MD as PCP - General (Family Medicine)    Chief Complaint;:   Chief Complaint   Patient presents with   • Follow-up     BMI 40.0-44.9         HPI      Office visit for Princess Bailey, a 46 y.o. female, established patient with class 3 obesity, for medical weight loss. Patient is satisfied with weight loss progress. Hunger control has improved although nausea has increased and has been unable to tolerate victoza, even at 0.6mg. Is doing 40 day sugar fast with friends and , this has been very helpful and has lost 8lb on home scale since starting in the beginning of January. Reports no side effects of prescribed medications today. Did start walking with , back started to hurt after about 10 minutes.       Body mass index is 44.81 kg/m²..     The patient is exercising with a FITT score of:    Frequency   Intensity Time Strength Training   []   0 None  []   0 None  []   0 None  [x]   0 None    [x]   1 (1-2x/week) []   1 (light) []   1 (<10 min) []   1 (1x/week)   []   2 (3-5x/week) [x]   2 (moderate) [x]   2 (10-20 min) []   2 (2x/week)   []   3 (daily)   []   3 (moderately hard)  []   4 (very hard) []   3 (20-30 min)  []   4 (>30 min) []   3 (3-4x/week)       Change in weight since last visit: +5lb but is down 8lb on home scale. Gained weight in December.    Recent Weight History:   Wt Readings from Last 3 Encounters:   21 111 kg (245 lb)   20 109 kg (240 lb 12.8 oz)   10/26/20 111 kg (245 lb 8 oz)     Start Weight: 239lb  Total Loss/%Loss of BBW:     Body composition analysis completed and showed:   BMR: 1790   %body fat:53.8%  Total fat mass (in lbs):132  Fat free mass (in lbs): 113  Total body water (in lb): 82.5    VS   Vitals:    02/02/21 0901   BP: (!) 165/107   Pulse: 117   Resp: 16   Temp: 97.3 °F (36.3 °C)   TempSrc:  "Infrared   SpO2: 98%   Weight: 111 kg (245 lb)   Height: 157.5 cm (62\")       Measurements (in inches)  Waist: 53.25\"    Past Medical History:   Diagnosis Date   • Anxiety    • Asthma    • Depression    • Fibromyalgia    • Hypertension    • Hypothyroid    • JELANI (obstructive sleep apnea)    • PCOS (polycystic ovarian syndrome)    • PID (pelvic inflammatory disease)        Patient Active Problem List   Diagnosis   • Mixed hyperlipidemia   • Dysmetabolic syndrome   • Abnormal LFTs   • Iron deficiency anemia   • PCOS (polycystic ovarian syndrome)   • Depression   • JELANI (obstructive sleep apnea)   • Asthma without status asthmaticus   • Gastro-esophageal reflux disease without esophagitis   • Back pain   • Interstitial cystitis   • ADD (attention deficit disorder)   • Attention deficit hyperactivity disorder   • Stage 1 chronic kidney disease   • Body mass index (BMI)40.0-44.9, adult (CMS/[facility])   • Carpal tunnel syndrome   • Degeneration of lumbosacral intervertebral disc   • Depressed bipolar I disorder (CMS/[facility])       Allergies   Allergen Reactions   • Phenergan  [Promethazine Hcl] Anaphylaxis   • Contrast Dye Unknown (See Comments)     Was told from allergy testing   • Ibuprofen Itching   • Toradol [Ketorolac Tromethamine] Itching         Current Outpatient Medications:   •  ALPRAZolam (XANAX) 0.5 MG tablet, , Disp: , Rfl:   •  buPROPion SR (WELLBUTRIN SR) 100 MG 12 hr tablet, Take 100 mg by mouth Daily., Disp: , Rfl:   •  Cholecalciferol (VITAMIN D3) 5000 units capsule capsule, Daily., Disp: , Rfl:   •  cloNIDine (CATAPRES) 0.1 MG tablet, Every 12 (Twelve) Hours., Disp: , Rfl:   •  cyanocobalamin 1000 MCG/ML injection, INJECT 1 ML INTO THE APPROPRIATE MUSCLE AS DIRECTED BY PRESCRIBER EVERY 14 (FOURTEEN) DAYS., Disp: 6 mL, Rfl: 1  •  cyclobenzaprine (FLEXERIL) 10 MG tablet, 1 tab(s), Disp: , Rfl:   •  esomeprazole (nexIUM) 40 MG capsule, Take 40 mg by mouth Daily., Disp: , Rfl: 11  •  fexofenadine (ALLEGRA ALLERGY) 180 " MG tablet, Allegra 180 mg tablet  Daily, Disp: , Rfl:   •  hydroCHLOROthiazide (HYDRODIURIL) 25 MG tablet, Take 25 mg by mouth Daily., Disp: , Rfl:   •  hydrOXYzine pamoate (VISTARIL) 25 MG capsule, Take 25 mg by mouth 3 (Three) Times a Day As Needed., Disp: , Rfl: 0  •  Insulin Pen Needle 32G X 4 MM misc, Use with victoza injection pen., Disp: 100 each, Rfl: 1  •  levothyroxine (SYNTHROID) 75 MCG tablet, Daily., Disp: , Rfl:   •  Liraglutide (Victoza) 18 MG/3ML solution pen-injector injection, Inject 1.8 mg under the skin into the appropriate area as directed Daily. Total daily dose 2.4mg (1.8mg+0.6mg), Disp: 6 pen, Rfl: 2  •  lisdexamfetamine (VYVANSE) 50 MG capsule, 1 cap(s), Disp: , Rfl:   •  metFORMIN ER (GLUCOPHAGE-XR) 500 MG 24 hr tablet, Take one tablet in the morning and two tablets at night., Disp: 270 tablet, Rfl: 0  •  montelukast (SINGULAIR) 10 MG tablet, montelukast 10 mg tablet, Disp: , Rfl:   •  Multiple Vitamins-Minerals (MULTIVITAMIN WITH MINERALS) tablet tablet, Take 2 tablets by mouth Daily., Disp: , Rfl:   •  RODEX FORTE, Take 1 capsule by mouth Daily. Take one to two capsules daily., Disp: , Rfl:   •  Vortioxetine HBr (Trintellix) 10 MG tablet, , Disp: , Rfl:   •  VRAYLAR 1.5 MG capsule capsule, Take 1.5 mg by mouth Daily., Disp: , Rfl:     Physical Exam:    General:  .well developed; well nourished  no acute distress  obese    Lungs:  breathing is unlabored  clear to auscultation bilaterally   Heart:  regular rhythm, tachycardia. S1, S2 normal, no murmur, click, rub or gallop     ASSESSMENT/PLAN:   Diagnoses and all orders for this visit:    1. PCOS (polycystic ovarian syndrome)  -     metFORMIN ER (GLUCOPHAGE-XR) 500 MG 24 hr tablet; Take one tablet in the morning and two tablets at night.  Dispense: 270 tablet; Refill: 0    2. Dysmetabolic syndrome  -     metFORMIN ER (GLUCOPHAGE-XR) 500 MG 24 hr tablet; Take one tablet in the morning and two tablets at night.  Dispense: 270 tablet; Refill:  0    3. Obesity, Class III, BMI 40-49.9 (morbid obesity) (CMS/[facility])  -     Liraglutide (Victoza) 18 MG/3ML solution pen-injector injection; Inject 1.8 mg under the skin into the appropriate area as directed Daily. Total daily dose 2.4mg (1.8mg+0.6mg)  Dispense: 6 pen; Refill: 2    I have instructed the patient to continue with pursuit of medical weight loss as a part of this program. Patient does meet criteria for use of anorectics at this time as BMI >27. Continue nutritional focus and work towards new exercise FITT goal of:     Frequency   Intensity Time Strength Training   []   0 None  []   0 None  []   0 None  [x]   0 None    []   1 (1-2x/week) [x]   1 (light) []   1 (<10 min) []   1 (1x/week)   [x]   2 (3-5x/week) []   2 (moderate) []   2 (10-20 min) []   2 (2x/week)   []   3 (daily)   []   3 (moderately hard)  []   4 (very hard) [x]   3 (20-30 min)  []   4 (>30 min) []   3 (3-4x/week)       The current plan for this month includes: Keep up the great focus, continuing 40 day and beyond fast. Increase exercise frequency to 3-5 days a week. Check BP and daily for 2 weeks. Follow up with PCP if continues to be > 150/90.    I spent 20 minutes face to face with patient and 15 minutes were spent counseling the patient on obesity and behavior change.     Plan of care reviewed with the patient at the conclusion of today's visit. We discussed the risks, benefits, and limitations of treatments. Continue medications and OTC supplements as discussed. Patient verbalizes understanding of and agreement with management plan.      Return in about 1 month (around 3/2/2021) for Next scheduled follow up.      ZARINA Barraza

## 2021-03-23 ENCOUNTER — OFFICE VISIT (OUTPATIENT)
Dept: BARIATRICS/WEIGHT MGMT | Facility: CLINIC | Age: 46
End: 2021-03-23

## 2021-03-23 VITALS
BODY MASS INDEX: 45.36 KG/M2 | HEART RATE: 107 BPM | SYSTOLIC BLOOD PRESSURE: 128 MMHG | OXYGEN SATURATION: 99 % | TEMPERATURE: 97.1 F | WEIGHT: 246.5 LBS | HEIGHT: 62 IN | DIASTOLIC BLOOD PRESSURE: 68 MMHG | RESPIRATION RATE: 18 BRPM

## 2021-03-23 DIAGNOSIS — E66.01 OBESITY, CLASS III, BMI 40-49.9 (MORBID OBESITY) (HCC): Primary | ICD-10-CM

## 2021-03-23 DIAGNOSIS — E88.81 DYSMETABOLIC SYNDROME: ICD-10-CM

## 2021-03-23 PROBLEM — N81.84 PELVIC MUSCLE WASTING: Status: ACTIVE | Noted: 2021-03-23

## 2021-03-23 PROBLEM — E28.2 PCOS (POLYCYSTIC OVARIAN SYNDROME): Status: RESOLVED | Noted: 2019-01-04 | Resolved: 2021-03-23

## 2021-03-23 PROBLEM — I10 ESSENTIAL HYPERTENSION: Status: ACTIVE | Noted: 2021-03-23

## 2021-03-23 PROCEDURE — MEDWTESTPT: Performed by: NURSE PRACTITIONER

## 2021-03-23 RX ORDER — LEVOTHYROXINE SODIUM 0.07 MG/1
TABLET ORAL
COMMUNITY
End: 2021-03-23

## 2021-03-23 RX ORDER — METOPROLOL SUCCINATE 25 MG/1
TABLET, EXTENDED RELEASE ORAL EVERY 24 HOURS
COMMUNITY

## 2021-03-23 RX ORDER — BACLOFEN 5 MG/5ML
SOLUTION ORAL
COMMUNITY
Start: 2021-02-26

## 2021-03-23 RX ORDER — HYDROCHLOROTHIAZIDE 25 MG/1
TABLET ORAL EVERY 24 HOURS
COMMUNITY
End: 2021-03-23

## 2021-03-23 NOTE — ASSESSMENT & PLAN NOTE
I have instructed the patient to continue with pursuit of medical weight loss as a part of this program. Patient does meet criteria for use of anorectics at this time as BMI >27. Increase victoza as tolerated. Add back chromate.     Continue nutritional focus and work towards new exercise FITT goal of: 2-1-3-2. Establish care with .    The current plan for this month includes: Restart food journal. Focus on decreasing portions and eliminating liquid calories. Increase exercise. Repeating labs with PCP, I gave patient a list of labs I would like- Vitamin D, b-12, CMP, lipids, A1c, insulin.    I spent 20 minutes face to face with patient and 15 minutes were spent counseling the patient on obesity and behavior change.

## 2021-04-06 ENCOUNTER — OFFICE VISIT (OUTPATIENT)
Dept: OTHER | Facility: HOSPITAL | Age: 46
End: 2021-04-06

## 2021-04-06 NOTE — PROGRESS NOTES
Exercise Education Note - Initial Visit    Patient: Princess Bailey       Date: 04/06/2021    Patient was seen today via telehealth due to restrictions for the COVID -19 pandemic.  Patient provided verbal consent to be treated over the phone.     For/Concerns: Patient was seen today concerning weight loss and excessive fatigue. She expresses concern over heart health and disease management and gets frustrated with the inability to improve stamina and increase activity. After further discuss patient shares that she has a consistently high resting heart rate of 100-120 bpm.  This would be considered light to moderate intensity exercise all day.  I explained to her that she is likely fatigued because her body feels like it is exercising constantly, additional exercise will not help until the heart rate is able to return to an ideal resting rate of less than 70 bpm.  She shares she has recently seen her PCP who has prescribed medication to help with this and has in recent days seen a RHR of 93.  This is encouraging and exercise will be kept minimal until appropriate dosage is met to get HR to acceptable levels.  This likely explains her increase in stress levels,  hunger, and cravings as her body is utilizing more energy throughout the day.  For the time being she will focus on light stretches and strengthening to help improve her back pain and muscle stiffness and work on creating a habit of movement. She has been instructed to keep it a low intensity. Appropriate exercise will be sent via e-mail.     Current Health Status: Fair    Current Exercise Abilities/Experience: Limited    Equipment / Facilities Available: 1-2 Dumbbells and Various Resistance Bands exercises will be performed at home on breaks.     Barriers to Exercise: Low energy, back pain    Desired Outcomes: To Feel better, improve health    Goals: Patient Stated she will do stretches and strengthening exercises at minimum 3 days per week during the day and  will journal how she feels. She was also encouraged to drink more water to hydrate her muscle and reduce stiffness.    Length of Visit: 60 min      Misael Jiang  04/06/2021  10:30 EDT

## 2021-04-19 ENCOUNTER — TRANSCRIBE ORDERS (OUTPATIENT)
Dept: ADMINISTRATIVE | Facility: HOSPITAL | Age: 46
End: 2021-04-19

## 2021-04-19 DIAGNOSIS — R07.9 CHEST PAIN, UNSPECIFIED TYPE: Primary | ICD-10-CM

## 2021-04-20 ENCOUNTER — OFFICE VISIT (OUTPATIENT)
Dept: BARIATRICS/WEIGHT MGMT | Facility: CLINIC | Age: 46
End: 2021-04-20

## 2021-04-20 ENCOUNTER — OFFICE VISIT (OUTPATIENT)
Dept: OTHER | Facility: HOSPITAL | Age: 46
End: 2021-04-20

## 2021-04-20 VITALS
BODY MASS INDEX: 45.91 KG/M2 | OXYGEN SATURATION: 99 % | RESPIRATION RATE: 18 BRPM | SYSTOLIC BLOOD PRESSURE: 120 MMHG | HEIGHT: 62 IN | WEIGHT: 249.5 LBS | HEART RATE: 89 BPM | DIASTOLIC BLOOD PRESSURE: 64 MMHG | TEMPERATURE: 96.9 F

## 2021-04-20 DIAGNOSIS — E88.81 INSULIN RESISTANCE: Primary | ICD-10-CM

## 2021-04-20 DIAGNOSIS — E88.81 DYSMETABOLIC SYNDROME: ICD-10-CM

## 2021-04-20 DIAGNOSIS — E66.01 OBESITY, CLASS III, BMI 40-49.9 (MORBID OBESITY) (HCC): ICD-10-CM

## 2021-04-20 PROBLEM — F32.A DEPRESSION: Status: RESOLVED | Noted: 2019-01-04 | Resolved: 2021-04-20

## 2021-04-20 PROBLEM — D50.9 IRON DEFICIENCY ANEMIA: Status: RESOLVED | Noted: 2019-01-04 | Resolved: 2021-04-20

## 2021-04-20 PROBLEM — E88.819 INSULIN RESISTANCE: Status: ACTIVE | Noted: 2019-01-04

## 2021-04-20 PROCEDURE — MEDWTESTPT: Performed by: NURSE PRACTITIONER

## 2021-04-20 RX ORDER — METOPROLOL SUCCINATE 25 MG/1
TABLET, EXTENDED RELEASE ORAL
COMMUNITY
End: 2021-04-20

## 2021-04-20 RX ORDER — ORAL SEMAGLUTIDE 3 MG/1
1 TABLET ORAL DAILY
Qty: 30 TABLET | Refills: 0 | COMMUNITY
Start: 2021-04-20 | End: 2021-07-27 | Stop reason: SINTOL

## 2021-04-20 RX ORDER — DIPHENOXYLATE HYDROCHLORIDE AND ATROPINE SULFATE 2.5; .025 MG/1; MG/1
TABLET ORAL
COMMUNITY
End: 2021-07-27

## 2021-04-20 NOTE — ASSESSMENT & PLAN NOTE
Weight reduction, regular exercise. Cont metformin. Start rybelsus for appetite regulation, understands this is off label.

## 2021-04-20 NOTE — PROGRESS NOTES
"[facility]   [address]  [place] [address]    Name: Princess Bailey  : 1975  Patient Care Team:  Dre Ann MD as PCP - General (Family Medicine)    Chief Complaint;:   Chief Complaint   Patient presents with   • Follow-up   • Nutrition Counseling   • Weight Loss        HPI      Office visit for Princess Bailey, a 46 y.o. female, established patient with class 3 obesity and pre-diabetes, for obesity management. Patient is unsatisfied with weight loss progress. Appetite is poorly controlled. Saw Misael Jiang, only doing stretches for now due to elevated heart rate which has improved some since last visit. PCP ordered labs and stress test yesterday. Still feeling sick to her stomach with Victoza 0.6mg. Nauseated and constantly wanting to eat crackers or soft drink to settle her stomach. Has vomited on occasion. Interested in other options for appetite regulation.     The patient is exercising with a FITT score of: 1-1-3-0.    Patient's Body mass index is 45.63 kg/m². BMI is above normal parameters. Recommendations include: exercise counseling, nutrition counseling and pharmacological intervention.      Change in weight since last visit: +3.0 LB GAIN    Recent Weight History:   Wt Readings from Last 3 Encounters:   21 113 kg (249 lb 8 oz)   21 112 kg (246 lb 8 oz)   21 111 kg (245 lb)     Start Weight: 239 LB  Total Loss/ 0 LB  %Loss of BBW: 4.39 %    Body composition analysis completed and showed:   %body fat:53.4 %  Total fat mass (in lbs):133.0 LB    VS   Vitals:    21 0836   BP: 120/64   BP Location: Left arm   Patient Position: Sitting   Cuff Size: Adult   Pulse: 89   Resp: 18   Temp: 96.9 °F (36.1 °C)   TempSrc: Temporal   SpO2: 99%   Weight: 113 kg (249 lb 8 oz)   Height: 157.5 cm (62\")       Measurements (in inches)  Waist: 52    Past Medical History:   Diagnosis Date   • Anxiety    • Asthma    • Depression    • " Fibromyalgia    • Hypertension    • Hypothyroid    • Iron deficiency anemia 1/4/2019   • JELANI (obstructive sleep apnea)    • PCOS (polycystic ovarian syndrome)    • PID (pelvic inflammatory disease)        Patient Active Problem List   Diagnosis   • Hyperlipidemia   • Dysmetabolic syndrome   • Abnormal LFTs   • Obstructive sleep apnea syndrome   • Asthma without status asthmaticus   • Gastro-esophageal reflux disease without esophagitis   • Back pain   • Chronic interstitial cystitis   • ADD (attention deficit disorder)   • Stage 1 chronic kidney disease   • Carpal tunnel syndrome   • Degeneration of lumbosacral intervertebral disc   • Depressed bipolar I disorder (CMS/[facility])   • Obesity, Class III, BMI 40-49.9 (morbid obesity) (CMS/[facility])   • Essential hypertension   • Pelvic muscle wasting         Current Outpatient Medications:   •  ALPRAZolam (XANAX) 0.5 MG tablet, , Disp: , Rfl:   •  Baclofen 5 MG/5ML solution, 1 tab(s), Disp: , Rfl:   •  buPROPion SR (WELLBUTRIN SR) 100 MG 12 hr tablet, Take 100 mg by mouth Daily., Disp: , Rfl:   •  Cholecalciferol (VITAMIN D3) 5000 units capsule capsule, Daily., Disp: , Rfl:   •  cyanocobalamin 1000 MCG/ML injection, INJECT 1 ML INTO THE APPROPRIATE MUSCLE AS DIRECTED BY PRESCRIBER EVERY 14 (FOURTEEN) DAYS., Disp: 6 mL, Rfl: 1  •  esomeprazole (nexIUM) 40 MG capsule, Take 40 mg by mouth Daily., Disp: , Rfl: 11  •  fexofenadine (ALLEGRA ALLERGY) 180 MG tablet, Allegra 180 mg tablet  Daily, Disp: , Rfl:   •  hydroCHLOROthiazide (HYDRODIURIL) 25 MG tablet, Take 25 mg by mouth Daily., Disp: , Rfl:   •  hydrOXYzine pamoate (VISTARIL) 25 MG capsule, Take 25 mg by mouth 3 (Three) Times a Day As Needed., Disp: , Rfl: 0  •  Insulin Pen Needle 32G X 4 MM misc, Use with victoza injection pen., Disp: 100 each, Rfl: 1  •  levothyroxine (SYNTHROID) 75 MCG tablet, Daily., Disp: , Rfl:   •  lisdexamfetamine (VYVANSE) 50 MG capsule, 1 cap(s), Disp: , Rfl:   •  metFORMIN ER (GLUCOPHAGE-XR) 500 MG  24 hr tablet, Take one tablet in the morning and two tablets at night., Disp: 270 tablet, Rfl: 0  •  metoprolol succinate XL (TOPROL-XL) 25 MG 24 hr tablet, Daily., Disp: , Rfl:   •  montelukast (SINGULAIR) 10 MG tablet, montelukast 10 mg tablet, Disp: , Rfl:   •  Multiple Vitamins-Minerals (MULTIVITAMIN WITH MINERALS) tablet tablet, Take 2 tablets by mouth Daily., Disp: , Rfl:   •  RODEX FORTE, Take 1 capsule by mouth Daily. Take one to two capsules daily., Disp: , Rfl:   •  Vortioxetine HBr (Trintellix) 10 MG tablet, , Disp: , Rfl:   •  VRAYLAR 1.5 MG capsule capsule, Take 1.5 mg by mouth Daily., Disp: , Rfl:   •  cyclobenzaprine (FLEXERIL) 10 MG tablet, 1 tab(s), Disp: , Rfl:   •  multivitamin (THERAGRAN) tablet tablet, Take  by mouth., Disp: , Rfl:   •  Semaglutide (Rybelsus) 3 MG tablet, Take 1 tablet by mouth Daily., Disp: 30 tablet, Rfl: 0    Physical Exam:    General:  .well developed; well nourished  no acute distress  obese    Lungs:  breathing is unlabored  clear to auscultation bilaterally   Heart:  regular rate and rhythm, S1, S2 normal, no murmur, click, rub or gallop     ASSESSMENT/PLAN:   Diagnoses and all orders for this visit:    1. Insulin resistance (Primary)  Assessment & Plan:  Weight reduction, regular exercise. Cont metformin. Start rybelsus for appetite regulation, understands this is off label.      Orders:  -     Semaglutide (Rybelsus) 3 MG tablet; Take 1 tablet by mouth Daily.  Dispense: 30 tablet; Refill: 0    2. Obesity, Class III, BMI 40-49.9 (morbid obesity) (CMS/HCC)  Assessment & Plan:  Patient's (Body mass index is 45.63 kg/m².) indicates that they are morbidly obese (BMI > 40 or > 35 with obesity - related health condition) with obesity-related health conditions that include obstructive sleep apnea, hypertension, dyslipidemias, GERD and pre-diabetes . Obesity is unchanged. BMI is is above average; BMI management plan is completed. We discussed portion control and increasing  exercise.     I have instructed the patient to continue with pursuit of medical weight loss as a part of this program. Patient does meet criteria for use of anorectics at this time as BMI >27. Continue nutritional focus and work towards new exercise FITT goal of: 2-1-3-2. Continue with exercise physiologist.    The current plan for this month includes: DC victoza, start rybelsus. 30 day sample provided today. Continue with Misael Jiang. Getting stress test for elevated heart rate.       3. Dysmetabolic syndrome  Assessment & Plan:  Weight reduction, regular exercise. Cont metformin. Start rybelsus for appetite regulation, understands this is off label.      I spent 20 minutes face to face with patient and 15 minutes were spent counseling the patient on obesity and behavior change.     Plan of care reviewed with the patient at the conclusion of today's visit. We discussed the risks, benefits, and limitations of treatments. Continue medications and OTC supplements as discussed. Patient verbalizes understanding of and agreement with management plan.      Return in about 4 weeks (around 5/18/2021) for Next scheduled follow up.      ZARINA Barraza

## 2021-04-20 NOTE — PROGRESS NOTES
Exercise Education Note - Follow Up    Patient: Princess Bailey       Date: 04/20/2021    Goal(s) Achievement Status: 100%  Patient completed chair stretches and exercises 3 times per week and as needed stating that they really improved her back pain.    She shares her heart rate is consistently in the 90's while sedentary and is having a stress test ordered by pcp.  We discussed possible reasons for higher RHR and I asked her to wear her monitor while sleeping to see what is true RHR. She is feeling very stressed and understands that can contribute to not meeting weight loss goals so we discussed some stress management techniques. She also share she is not counting calories because that stresses her out more, so I suggested she get a portion plate with dividers to help.      She is willing and feels ready to add in chair cardio for 10-20 minutes as tolerated 3 days per week. I have emailed her suggested videos to begin using.  We will meet weekly for accountability and progression of exercise and lifestyle changes.        Misael Jiang  04/20/2021  11:23 EDT

## 2021-04-20 NOTE — ASSESSMENT & PLAN NOTE
Patient's (Body mass index is 45.63 kg/m².) indicates that they are morbidly obese (BMI > 40 or > 35 with obesity - related health condition) with obesity-related health conditions that include obstructive sleep apnea, hypertension, dyslipidemias, GERD and pre-diabetes . Obesity is unchanged. BMI is is above average; BMI management plan is completed. We discussed portion control and increasing exercise.     I have instructed the patient to continue with pursuit of medical weight loss as a part of this program. Patient does meet criteria for use of anorectics at this time as BMI >27. Continue nutritional focus and work towards new exercise FITT goal of: 2-1-3-2. Continue with exercise physiologist.    The current plan for this month includes: DC victoza, start rybelsus. 30 day sample provided today. Continue with Misael Jiang. Getting stress test for elevated heart rate.

## 2021-04-27 ENCOUNTER — APPOINTMENT (OUTPATIENT)
Dept: OTHER | Facility: HOSPITAL | Age: 46
End: 2021-04-27

## 2021-05-11 ENCOUNTER — OFFICE VISIT (OUTPATIENT)
Dept: OTHER | Facility: HOSPITAL | Age: 46
End: 2021-05-11

## 2021-05-11 NOTE — PROGRESS NOTES
Exercise Education Note - Follow Up    Patient: Princess Bailey       Date: 05/11/2021    Telephone Exercise consult, 30 minutes. This medical referred consult was provided as a Phone call, as patient is unable to attend an in-office appointment due to the COVID-19 crisis. Consent for treatment was given verbally.    Patient presented today for continued exercise education follow up via the phone.  We discussed her progress since last visit and ongoing heart rate monitoring.  She shares she has slept with it on and is 70bpm at night which is still a bit higher than preferred and she has a stress test scheduled next week.  We reviewed how as fitness / endurance improved resting heart rate should also improve.  She did begin using chair exercise video sent and and is seeing gradual improvement using these videos.  Primary concern was with difficulty breathing during exercise.  We discuss how this can be a normal part of working on fitness and to take breaks as needed then resume activity.  Overtime she should be able to go for longer periods without needing breaks.  Patient is going to continue using videos and track progress for improved endurance.     Additionally we discussed her hydration and balancing electrolytes can impact some of her symptoms.  It was suggested she try some bottle water that has electrolytes to see if she noticed improvement.  Patient has requested additional follow up in 2 weeks to discuss progress.     Misael Jiang  05/11/2021  12:41 EDT

## 2021-05-25 ENCOUNTER — APPOINTMENT (OUTPATIENT)
Dept: OTHER | Facility: HOSPITAL | Age: 46
End: 2021-05-25

## 2021-06-01 ENCOUNTER — OFFICE VISIT (OUTPATIENT)
Dept: OTHER | Facility: HOSPITAL | Age: 46
End: 2021-06-01

## 2021-06-01 NOTE — PROGRESS NOTES
"  Exercise Education Note - Follow Up    Patient: Princess Bailey       Date: 06/01/2021    Telephone Exercise consult, 30 minutes. This medical referred consult was provided as a Phone call, as patient is unable to attend an in-office appointment due to the COVID-19 crisis. Consent for treatment was given verbally.    Patient was seen today as a follow up visit for exercise counseling in regards to weight loss and health improvement. Over the past weeks patient has had concerns with heart rate and difficulties breathing leading to conservative approach until further diagnosis could be completed.  Patient participated in stress test and received an all clear which now gives her more confidence to comfortably focus on her activity.  She admits the last week was busy and she had not made exercise a priority stating \"she just forgot.\" She has place notes around her home and office as reminders to incorporate more movement into her day. She has also noticed increased back pain since activity has declined.  We discussed appropriate intensity and duration of activity to start improving her fitness. Patient agrees that 10-15 mins, 3 days per week at 115-135 bpm are realistic goals.  Additionally she will start back incorporating movent and stretch breaks as well as making daily hydration a priority.  Patient has requested a follow up in 2 weeks.     Misael Jiang  06/01/2021  10:27 EDT    "

## 2021-06-15 ENCOUNTER — OFFICE VISIT (OUTPATIENT)
Dept: OTHER | Facility: HOSPITAL | Age: 46
End: 2021-06-15

## 2021-06-15 NOTE — PROGRESS NOTES
"Exercise Education Note - Follow Up    Patient: Princess Bailey       Date:06/15/2021  Telephone Exercise consult, 30 minutes. This medical referred consult was provided as a Phone call, as patient is unable to attend an in-office appointment due to the COVID-19 crisis. Consent for treatment was given verbally.    Patient was seen today for continued follow up for health improvement and weight loss.  She states she is doing better with her physical activity.  While on vacation she walked for 10 mins sustaining a HR of 140 bpm and recalled it being challanging.  By the third session she did feel she began to feel better afterwards.  Prior to vacations she was doing her stretches and office movement minutes a \"few times\" at work. She had gained a few pounds over vacation which I explained was normal and has since lost 3 and this morning weighed 251.6 on scale at home.  She feels good about achieving 3 walking sessions per week and an additional 2 stretch and resistance band sessions at work.  Her hydration is improving and she is now reaching 50-66 oz of water daily and making efforts to hydrate before meals.  We briefly discussed her concerns with soda and reviewed to various health concerns that sodas are linked to.  She agreed it would be difficult but is willing to try reducing her intake of soda.     Patient request phone follow up in 3 weeks and has been scheduled.           Misael Jiang  [unfilled]  11:04 EDT    "

## 2021-06-19 DIAGNOSIS — E28.2 PCOS (POLYCYSTIC OVARIAN SYNDROME): ICD-10-CM

## 2021-06-19 DIAGNOSIS — E88.81 DYSMETABOLIC SYNDROME: ICD-10-CM

## 2021-06-22 RX ORDER — METFORMIN HYDROCHLORIDE 500 MG/1
TABLET, EXTENDED RELEASE ORAL
Qty: 270 TABLET | Refills: 0 | Status: SHIPPED | OUTPATIENT
Start: 2021-06-22 | End: 2021-09-23

## 2021-06-29 ENCOUNTER — OFFICE VISIT (OUTPATIENT)
Dept: BARIATRICS/WEIGHT MGMT | Facility: CLINIC | Age: 46
End: 2021-06-29

## 2021-06-29 VITALS
SYSTOLIC BLOOD PRESSURE: 124 MMHG | WEIGHT: 260 LBS | OXYGEN SATURATION: 98 % | RESPIRATION RATE: 18 BRPM | BODY MASS INDEX: 47.84 KG/M2 | HEART RATE: 93 BPM | HEIGHT: 62 IN | TEMPERATURE: 97.3 F | DIASTOLIC BLOOD PRESSURE: 74 MMHG

## 2021-06-29 DIAGNOSIS — E66.01 OBESITY, CLASS III, BMI 40-49.9 (MORBID OBESITY) (HCC): Primary | ICD-10-CM

## 2021-06-29 PROCEDURE — MEDWTESTPT: Performed by: NURSE PRACTITIONER

## 2021-06-29 RX ORDER — DIVALPROEX SODIUM 250 MG/1
TABLET, EXTENDED RELEASE ORAL
COMMUNITY
Start: 2021-06-16 | End: 2021-07-27

## 2021-06-29 RX ORDER — LOVASTATIN 20 MG/1
20 TABLET ORAL
COMMUNITY
Start: 2021-04-20 | End: 2022-08-15

## 2021-06-29 RX ORDER — DIETHYLPROPION HYDROCHLORIDE 25 MG/1
TABLET ORAL
Qty: 28 EACH | Refills: 0 | Status: SHIPPED | OUTPATIENT
Start: 2021-06-29 | End: 2022-08-15

## 2021-06-29 RX ORDER — DIVALPROEX SODIUM 500 MG/1
TABLET, EXTENDED RELEASE ORAL
COMMUNITY
Start: 2021-06-16 | End: 2021-07-27 | Stop reason: ALTCHOICE

## 2021-06-29 NOTE — PROGRESS NOTES
"Chief Complaint  Follow-up, Nutrition Counseling, and Weight Loss    Subjective          Princess Bailey presents to [facility] for obesity management.     Start weight: 239 pounds.  Today's weight is 118 kg (260 lb)260 pounds and weight loss since initiation of medical weight loss is  pounds.  Patient's Body mass index is 47.55 kg/m².    Change in weight since last visit:  +10.5 lb gain    Patient is unsatisfied with weight loss progress. Appetite is poorly controlled. Having diarrhea, unsure if it is related to medications. Was much worse with rybelsus. Started Depakote for sleep problems and racing thoughts, knows it is weight positive. She would like to discuss medication options. She completed a stress test and echocardiogram.     The patient is exercisin-1-2-0. Working with Misael Jiang- low intensity.    Body composition analysis completed and showed:   %body fat:53.4%  Total fat mass (in lbs):139.0 lb    VS   Vitals:    21 0916   BP: 124/74   BP Location: Left arm   Patient Position: Sitting   Cuff Size: Adult   Pulse: 93   Resp: 18   Temp: 97.3 °F (36.3 °C)   SpO2: 98%   Weight: 118 kg (260 lb)   Height: 157.5 cm (62\")       Measurements (in inches)  Waist: 54    Objective   Vital Signs:   /74 (BP Location: Left arm, Patient Position: Sitting, Cuff Size: Adult)   Pulse 93   Temp 97.3 °F (36.3 °C)   Resp 18   Ht 157.5 cm (62\")   Wt 118 kg (260 lb)   SpO2 98%   BMI 47.55 kg/m²     Physical Exam   General appears stated age and central obesity   HEENT PERRLA, EOM intact and conjunctivae normal   Chest/lungs Normal rate, Regular rhythm and Breathing is unlabored   Extremities nonpitting edema   Neuro Good historian and No focal deficit   Skin Warm, dry, intact   Psych normal behavior, normal thought content and normal concentration, tearful     Result Review :                 Assessment and Plan    Diagnoses and all orders for this visit:    1. Obesity, " Class III, BMI 40-49.9 (morbid obesity) (CMS/[facility]) (Primary)  Assessment & Plan:  I have instructed the patient to continue with pursuit of medical weight loss as a part of this program. Patient does meet criteria for use of anorectics at this time as BMI >27.     Continue nutritional focus and work towards new exercise FITT goal of: 2-1-2-2. Starting new plan with stephanie since stress test was acceptable.   The current plan for this month includes: Increase physical activity, see PCP for diarrhea, start diethylpropion in the evening (on vyvanse). Restart food journal. Consider wegovy next visit if diarrhea is better.       Orders:  -     Diethylpropion HCl 25 MG tablet; Take one tablet at 6pm.  Dispense: 28 each; Refill: 0        Follow Up   Return in about 4 weeks (around 7/27/2021) for Next scheduled follow up.  Patient was given instructions and counseling regarding her condition or for health maintenance advice. Please see specific information pulled into the AVS if appropriate.     ZARINA Barraza

## 2021-06-29 NOTE — ASSESSMENT & PLAN NOTE
I have instructed the patient to continue with pursuit of medical weight loss as a part of this program. Patient does meet criteria for use of anorectics at this time as BMI >27.     Continue nutritional focus and work towards new exercise FITT goal of: 2-1-2-2. Starting new plan with stephanie since stress test was acceptable.   The current plan for this month includes: Increase physical activity, see PCP for diarrhea, start diethylpropion in the evening (on vyvanse). Restart food journal. Consider wegovy next visit if diarrhea is better.

## 2021-07-06 ENCOUNTER — APPOINTMENT (OUTPATIENT)
Dept: OTHER | Facility: HOSPITAL | Age: 46
End: 2021-07-06

## 2021-07-27 ENCOUNTER — OFFICE VISIT (OUTPATIENT)
Dept: BARIATRICS/WEIGHT MGMT | Facility: CLINIC | Age: 46
End: 2021-07-27

## 2021-07-27 VITALS
TEMPERATURE: 97.3 F | RESPIRATION RATE: 18 BRPM | HEART RATE: 104 BPM | DIASTOLIC BLOOD PRESSURE: 64 MMHG | HEIGHT: 62 IN | SYSTOLIC BLOOD PRESSURE: 138 MMHG | OXYGEN SATURATION: 99 % | WEIGHT: 257 LBS | BODY MASS INDEX: 47.29 KG/M2

## 2021-07-27 DIAGNOSIS — E66.01 OBESITY, CLASS III, BMI 40-49.9 (MORBID OBESITY) (HCC): Primary | ICD-10-CM

## 2021-07-27 DIAGNOSIS — E88.81 DYSMETABOLIC SYNDROME: ICD-10-CM

## 2021-07-27 DIAGNOSIS — I10 ESSENTIAL HYPERTENSION: ICD-10-CM

## 2021-07-27 PROCEDURE — 99213 OFFICE O/P EST LOW 20 MIN: CPT | Performed by: NURSE PRACTITIONER

## 2021-07-27 RX ORDER — TRAZODONE HYDROCHLORIDE 50 MG/1
TABLET ORAL
COMMUNITY
Start: 2021-07-15

## 2021-07-27 NOTE — ASSESSMENT & PLAN NOTE
Patient's (Body mass index is 47.01 kg/m².) indicates that they are morbidly obese (BMI > 40 or > 35 with obesity - related health condition) with obesity-related health conditions that include obstructive sleep apnea, hypertension and GERD . Obesity is improving with treatment. BMI is is above average; BMI management plan is completed. We discussed low calorie, low carb based diet program, portion control, increasing exercise and pharmacologic options including diethylpropion and metformin.    I have instructed the patient to continue with pursuit of medical weight loss as a part of this program. Patient does meet criteria for use of anorectics at this time as BMI >27 and is not at treatment goal.     Continue nutritional focus and work towards new exercise FITT goal of: 2-2-4-2. Restart with .   The current plan for this month includes: Add diethylpropion now that sleep is better with trazodone. Goal 3-6lb.

## 2021-07-27 NOTE — PROGRESS NOTES
"Chief Complaint  Follow-up    Subjective          Princess Bailey presents to [facility] for obesity management.   Patient is satisfied with weight loss progress. Appetite is moderately controlled. Reports no side effects of prescribed medications today. Stopped depakote and trintellix. Weight was 263lb 2 weeks ago when stopping depakote and today was 256lb on home scale. Forgot to take diethylpropion- was not sleeping at all but now is on trazodone. Feet were swelling with depakote as well that caused problems with walking. That is better now. Diarrhea improved now that she's off of rybelsus.     The patient is exercisin-1-1-0. Hasn't seen Misael in about 2 weeks but she is ready to get back. Knows her mood changes significantly affect her motivation for healthy behaviors.      Start weight: 239 lb pounds.  Today's weight is 117 kg (257 lb)257  pounds and weight loss since initiation of medical weight loss is +18 pounds.    Change in weight since last visit: -3lb    Body composition analysis completed and showed:   %body fat:53.2%  Total fat mass (in lbs):136.5 lb    VS   Vitals:    21 1018   BP: 138/64   BP Location: Left arm   Patient Position: Sitting   Cuff Size: Adult   Pulse: 104   Resp: 18   Temp: 97.3 °F (36.3 °C)   TempSrc: Temporal   SpO2: 99%   Weight: 117 kg (257 lb)   Height: 157.5 cm (62\")   PainSc: 0-No pain       Measurements (in inches)  Waist: 53.75    Objective   Vital Signs:   /64 (BP Location: Left arm, Patient Position: Sitting, Cuff Size: Adult)   Pulse 104   Temp 97.3 °F (36.3 °C) (Temporal)   Resp 18   Ht 157.5 cm (62\")   Wt 117 kg (257 lb)   SpO2 99%   BMI 47.01 kg/m²     Physical Exam   General appears stated age and central obesity   HEENT PERRLA, EOM intact and conjunctivae normal   Chest/lungs Tachycardia, Regular rhythm and Breathing is unlabored   Extremities without edema   Neuro Good historian and No focal deficit   Skin Warm, " dry, intact   Psych normal behavior, normal thought content and normal concentration     Result Review :                 Assessment and Plan    Diagnoses and all orders for this visit:    1. Obesity, Class III, BMI 40-49.9 (morbid obesity) (CMS/[facility]) (Primary)  Assessment & Plan:  Patient's (Body mass index is 47.01 kg/m².) indicates that they are morbidly obese (BMI > 40 or > 35 with obesity - related health condition) with obesity-related health conditions that include obstructive sleep apnea, hypertension and GERD . Obesity is improving with treatment. BMI is is above average; BMI management plan is completed. We discussed low calorie, low carb based diet program, portion control, increasing exercise and pharmacologic options including diethylpropion and metformin.    I have instructed the patient to continue with pursuit of medical weight loss as a part of this program. Patient does meet criteria for use of anorectics at this time as BMI >27 and is not at treatment goal.     Continue nutritional focus and work towards new exercise FITT goal of: 2-2-4-2. Restart with .   The current plan for this month includes: Add diethylpropion now that sleep is better with trazodone. Goal 3-6lb.          2. Essential hypertension  Assessment & Plan:  Managed by PCP. Weight reduction should help.       3. Dysmetabolic syndrome  Assessment & Plan:  Continue low carb diet, increase physical activity, and metformin. Weight reduction should help.         I spent 27 minutes caring for Princess on this date of service. This time includes time spent by me in the following activities:performing a medically appropriate examination and/or evaluation , counseling and educating the patient/family/caregiver, ordering medications, tests, or procedures and documenting information in the medical record  Follow Up   Return in about 4 weeks (around 8/24/2021) for Next scheduled follow up.  Patient was given instructions and  counseling regarding her condition or for health maintenance advice. Please see specific information pulled into the AVS if appropriate.     ZARINA Barraza

## 2021-09-23 DIAGNOSIS — E28.2 PCOS (POLYCYSTIC OVARIAN SYNDROME): ICD-10-CM

## 2021-09-23 DIAGNOSIS — E88.81 DYSMETABOLIC SYNDROME: ICD-10-CM

## 2021-09-23 RX ORDER — METFORMIN HYDROCHLORIDE 500 MG/1
TABLET, EXTENDED RELEASE ORAL
Qty: 270 TABLET | Refills: 0 | Status: SHIPPED | OUTPATIENT
Start: 2021-09-23

## 2022-08-15 ENCOUNTER — TELEPHONE (OUTPATIENT)
Dept: NEUROSURGERY | Facility: CLINIC | Age: 47
End: 2022-08-15

## 2022-08-15 ENCOUNTER — OFFICE VISIT (OUTPATIENT)
Dept: NEUROSURGERY | Facility: CLINIC | Age: 47
End: 2022-08-15

## 2022-08-15 VITALS — DIASTOLIC BLOOD PRESSURE: 80 MMHG | BODY MASS INDEX: 47.55 KG/M2 | WEIGHT: 260 LBS | SYSTOLIC BLOOD PRESSURE: 164 MMHG

## 2022-08-15 DIAGNOSIS — E66.01 OBESITY, CLASS III, BMI 40-49.9 (MORBID OBESITY): Primary | ICD-10-CM

## 2022-08-15 DIAGNOSIS — G56.01 CARPAL TUNNEL SYNDROME OF RIGHT WRIST: Primary | ICD-10-CM

## 2022-08-15 DIAGNOSIS — M54.2 NECK PAIN: ICD-10-CM

## 2022-08-15 PROCEDURE — 99204 OFFICE O/P NEW MOD 45 MIN: CPT | Performed by: NEUROLOGICAL SURGERY

## 2022-08-15 RX ORDER — ATORVASTATIN CALCIUM 20 MG/1
TABLET, FILM COATED ORAL
COMMUNITY
Start: 2022-06-27

## 2022-08-15 RX ORDER — ESCITALOPRAM OXALATE 20 MG/1
TABLET ORAL
COMMUNITY

## 2022-08-15 NOTE — PATIENT INSTRUCTIONS
- Start Physical Therapy for your neck and wrist.   - Continue wearing wrist splints at night for the carpal tunnel.   - Referral to Dr. Rafaela Nelson for wrist injections

## 2022-08-15 NOTE — TELEPHONE ENCOUNTER
Patient was seen this morning.     Per Dr. Nevarez. Please schedule the patient to see a PA in 6-8 weeks with an A1C. Order has been placed.

## 2022-08-15 NOTE — PROGRESS NOTES
NAME: TIGIST STUBBS   DOS: 8/15/2022  : 1975  PCP: Dre Ann MD    Chief Complaint:    Chief Complaint   Patient presents with   • Right Arm Numbness       History of Present Illness:  47 y.o. female   I saw this 47-year-old female in neurosurgical consultation she presents with a 2-year history of bilateral hand numbness.  The pain and numbness is worse on the right side she has been walking with a walker for a couple of days it has made it quite worse    She also has arthritic neck pain she is getting over a right-sided tendon injury in her right lower extremity    She has occasional midline scapula pain and feels tight she denies clearly symptoms of cervical myelopathy but does report numbness that involves her entire right arm but it is intermittent in nature    She is quite annoyed with this and frustrated she is been through therapy on the leg but not the neck she is here for evaluation she has a history of diabetes    PMHX  Allergies:  Allergies   Allergen Reactions   • Phenergan  [Promethazine Hcl] Anaphylaxis   • Promethazine Anaphylaxis   • Iodinated Casein Unknown - Low Severity   • Ondansetron Hives   • Other Itching     steroids   • Sulfa Antibiotics Unknown - High Severity   • Contrast Dye Unknown (See Comments)     Was told from allergy testing  Other reaction(s): Unknown (See Comments)  Was told from allergy testing   • Ibuprofen Itching   • Ketorolac Tromethamine Itching     Other reaction(s): Unknown     Medications    Current Outpatient Medications:   •  ALPRAZolam (XANAX) 0.5 MG tablet, , Disp: , Rfl:   •  atorvastatin (LIPITOR) 20 MG tablet, TAKE 1 TABLET BY MOUTH AT BEDTIME (REPLACES LOVASTATIN), Disp: , Rfl:   •  Baclofen 5 MG/5ML solution, 1 tab(s), Disp: , Rfl:   •  Cholecalciferol (VITAMIN D3) 5000 units capsule capsule, Daily., Disp: , Rfl:   •  cyclobenzaprine (FLEXERIL) 10 MG tablet, 1 tab(s), Disp: , Rfl:   •  escitalopram (LEXAPRO) 20 MG tablet, escitalopram 20 mg tablet,  "  History     Chief Complaint   Patient presents with     Laceration     top of left foot. mirror broke and cut her. last tetanus 2017      Back Pain     states she has a hx of DDD. back is hurting her.     Dental Pain     states \"mouth has been really messed up\" was suppsoed to have surgery and lost insurance. states gums and moth are hurting her      HPI  Milla Bose is a 37 year old female who presents to  for multiple complaints.  She has a laceration to her left foot.  She states that she was walking past a mirror in her home that was broken when a piece fell off and cut the top of her foot.  She states there was never any glass stuck in her foot.  She did clean her foot off with soap and water prior to this arrival.  Last tetanus was 2017.  Additionally, patient states she has had mild lower back pain x1 week radiating down to her buttocks.  She thinks she strained her back while carrying groceries.  Denies saddle anesthesia, urinary retention, bowel or bladder incontinence.  No fevers, nausea, vomiting.  She does report a history of degenerative disc disease.  Patient stated that she does not want her back pain (or any other issues) aside from her foot laceration addressed during this visit.     Allergies:  Allergies   Allergen Reactions     Codeine GI Disturbance       Problem List:    Patient Active Problem List    Diagnosis Date Noted     Schizoaffective disorder (H) 07/03/2012     Priority: Medium     PTSD (post-traumatic stress disorder) 07/03/2012     Priority: Medium     Anxiety state 07/03/2012     Priority: Medium     Panic disorder with agoraphobia 07/03/2012     Priority: Medium     Major depression 08/03/2011     Priority: Medium     Overview:   IMO Update 10/11       Personality, multiple (H) 08/03/2011     Priority: Medium     Overview:   IMO Update 10/11       Astigmatism 08/31/2009     Priority: Medium     Overview:   IMO Update       Myopia 08/31/2009     Priority: Medium     Congenital " Disp: , Rfl:   •  esomeprazole (nexIUM) 40 MG capsule, Take 40 mg by mouth Daily., Disp: , Rfl: 11  •  fexofenadine (ALLEGRA) 180 MG tablet, Allegra 180 mg tablet  Daily, Disp: , Rfl:   •  hydroCHLOROthiazide (HYDRODIURIL) 25 MG tablet, Take 25 mg by mouth Daily., Disp: , Rfl:   •  hydrOXYzine pamoate (VISTARIL) 25 MG capsule, Take 25 mg by mouth 3 (Three) Times a Day As Needed., Disp: , Rfl: 0  •  levothyroxine (SYNTHROID, LEVOTHROID) 75 MCG tablet, Daily., Disp: , Rfl:   •  lisdexamfetamine (VYVANSE) 50 MG capsule, 1 cap(s), Disp: , Rfl:   •  metFORMIN ER (GLUCOPHAGE-XR) 500 MG 24 hr tablet, TAKE ONE TABLET IN THE MORNING AND TWO TABLETS AT NIGHT., Disp: 270 tablet, Rfl: 0  •  metoprolol succinate XL (TOPROL-XL) 25 MG 24 hr tablet, Daily., Disp: , Rfl:   •  montelukast (SINGULAIR) 10 MG tablet, montelukast 10 mg tablet, Disp: , Rfl:   •  traZODone (DESYREL) 50 MG tablet, , Disp: , Rfl:   Past Medical History:  Past Medical History:   Diagnosis Date   • Anxiety    • Asthma    • Claustrophobia    • Depression    • Fibromyalgia    • Hypertension    • Hypothyroid    • Iron deficiency anemia 01/04/2019   • JELANI (obstructive sleep apnea)    • PCOS (polycystic ovarian syndrome)    • PID (pelvic inflammatory disease)      Past Surgical History:  Past Surgical History:   Procedure Laterality Date   • APPENDECTOMY  2012   • CHOLECYSTECTOMY     • HYSTERECTOMY  2016    PARTIAL   • PERONEAL TENDON EXPLORATION  2022     Social Hx:  Social History     Tobacco Use   • Smoking status: Never Smoker   • Smokeless tobacco: Never Used   Substance Use Topics   • Alcohol use: No   • Drug use: No     Family Hx:  Family History   Problem Relation Age of Onset   • Hypertension Mother    • Arthritis Mother    • Hypertension Father    • Diabetes Father    • Asthma Father      Review of Systems:        Review of Systems   Constitutional: Negative for activity change, appetite change, chills, diaphoresis, fatigue, fever and unexpected weight  cataract 08/31/2009     Priority: Medium     Overview:   IMO Update 10/11          Past Medical History:    Past Medical History:   Diagnosis Date     Anxiety 07/03/2012     Asthma,unspecified type, unspecified 07/03/2012     Panic disorder with agoraphobia 07/03/2012     PTSD (post-traumatic stress disorder) 07/03/2012     Schizoaffective disorder 07/03/2012       Past Surgical History:    Past Surgical History:   Procedure Laterality Date     cyst removal left breast       cyst removed from right ear       GYN SURGERY      laser of cervix       Family History:    Family History   Problem Relation Age of Onset     Heart Disease Mother      Alcohol/Drug Mother      Unknown/Adopted Father      Cancer Paternal Grandmother         Cervical       Social History:  Marital Status:  Single [1]  Social History     Tobacco Use     Smoking status: Current Every Day Smoker     Packs/day: 0.50     Years: 20.00     Pack years: 10.00     Types: Cigarettes     Smokeless tobacco: Never Used   Substance Use Topics     Alcohol use: Yes     Comment: rare     Drug use: No     Comment: marijuana        Medications:    ALPRAZolam (XANAX PO)  amphetamine-dextroamphetamine (ADDERALL XR) 20 MG 24 hr capsule  amphetamine-dextroamphetamine (ADDERALL) 20 MG tablet  GABAPENTIN PO  Acetaminophen (TYLENOL PO)  ibuprofen (ADVIL/MOTRIN) 800 MG tablet  levonorgestrel (MIRENA) 20 MCG/24HR IUD          Review of Systems   Constitutional: Negative for appetite change and fever.   Gastrointestinal: Negative for nausea and vomiting.   Genitourinary: Negative for dysuria.   Musculoskeletal: Positive for back pain.   Skin: Positive for wound.   All other systems reviewed and are negative.      Physical Exam   Heart Rate: 106  Temp: 98.7  F (37.1  C)  Resp: 18  SpO2: 100 %      Physical Exam  Vitals signs and nursing note reviewed.   Constitutional:       General: She is not in acute distress.     Appearance: She is not ill-appearing, toxic-appearing or  change.   HENT: Positive for tinnitus. Negative for congestion, dental problem, drooling, ear discharge, ear pain, facial swelling, hearing loss, mouth sores, nosebleeds, postnasal drip, rhinorrhea, sinus pressure, sinus pain, sneezing, sore throat, trouble swallowing and voice change.    Eyes: Negative for photophobia, pain, discharge, redness, itching and visual disturbance.   Respiratory: Positive for apnea. Negative for cough, choking, chest tightness, shortness of breath, wheezing and stridor.    Cardiovascular: Negative for chest pain, palpitations and leg swelling.   Gastrointestinal: Negative for abdominal distention, abdominal pain, anal bleeding, blood in stool, constipation, diarrhea, nausea, rectal pain and vomiting.   Endocrine: Negative for cold intolerance, heat intolerance, polydipsia, polyphagia and polyuria.   Genitourinary: Negative for decreased urine volume, difficulty urinating, dyspareunia, dysuria, enuresis, flank pain, frequency, genital sores, hematuria, menstrual problem, pelvic pain, urgency, vaginal bleeding, vaginal discharge and vaginal pain.   Musculoskeletal: Negative for arthralgias, back pain, gait problem, joint swelling, myalgias, neck pain and neck stiffness.   Skin: Negative for color change, pallor, rash and wound.   Allergic/Immunologic: Positive for environmental allergies. Negative for food allergies and immunocompromised state.   Neurological: Positive for weakness, numbness and headaches. Negative for dizziness, tremors, seizures, syncope, facial asymmetry, speech difficulty and light-headedness.   Hematological: Negative for adenopathy. Does not bruise/bleed easily.   Psychiatric/Behavioral: Positive for decreased concentration and dysphoric mood. Negative for agitation, behavioral problems, confusion, hallucinations, self-injury, sleep disturbance and suicidal ideas. The patient is nervous/anxious. The patient is not hyperactive.         I have reviewed this note  diaphoretic.   HENT:      Head: Atraumatic.   Neck:      Musculoskeletal: Neck supple. No muscular tenderness.   Cardiovascular:      Pulses:           Dorsalis pedis pulses are 2+ on the left side.   Pulmonary:      Effort: Pulmonary effort is normal.   Abdominal:      Tenderness: There is no right CVA tenderness or left CVA tenderness.      Comments: No CVA tenderness.   Musculoskeletal:      Lumbar back: She exhibits tenderness. She exhibits no swelling.        Feet:       Comments: Mid lower back tenderness to palpation and no step-offs, no swelling.   Feet:      Left foot:      Skin integrity: No erythema.      Comments: 2.5 cm laceration to left dorsal foot.  Bleeding controlled.  Pedal pulse 2+.  Cap refill less than 2 seconds.  Full range of motion to foot, ankle and toes.  Full sensation to left foot.    Skin:     General: Skin is warm and dry.      Capillary Refill: Capillary refill takes less than 2 seconds.      Findings: Laceration present.   Neurological:      Mental Status: She is alert and oriented to person, place, and time.   Psychiatric:         Mood and Affect: Mood normal.         ED Course        [facility]    -Laceration Repair    Date/Time: 4/20/2020 4:30 PM  Performed by: Karen Boyce CNP  Authorized by: Karen Boyce CNP       ANESTHESIA (see MAR for exact dosages):     Anesthesia method:  None  LACERATION DETAILS     Location:  Foot    Foot location:  Top of L foot    Length (cm):  2.5    Depth (mm):  12    REPAIR TYPE:     Repair type:  Simple      EXPLORATION:     Hemostasis achieved with:  Direct pressure    Wound exploration: wound explored through full range of motion and entire depth of wound probed and visualized      Wound extent: no foreign body, no nerve damage and no tendon damage      TREATMENT:     Area cleansed with:  Saline    Amount of cleaning:  Standard    Irrigation solution:  Sterile saline    Irrigation volume:  30ml    Irrigation method:  Pressure  "wash    Visualized foreign bodies/material removed: no      SKIN REPAIR     Repair method:  Steri-Strips    Number of Steri-Strips:  3    APPROXIMATION     Approximation:  Close    POST-PROCEDURE DETAILS     Dressing:  Antibiotic ointment, non-adherent dressing and bulky dressing      PROCEDURE   Patient Tolerance:  Patient tolerated the procedure well with no immediate complications                   No results found for this or any previous visit (from the past 24 hour(s)).    Medications - No data to display    Assessments & Plan (with Medical Decision Making)   Laceration of left foot, initial encounter:  37-year-old female that presented to urgent care for a left foot laceration from a piece of glass from a broken mirror that happened earlier today.  Patient has a 2.5 x 0.12 cm laceration to left mid-dorsal foot.  Cap refill less than 2 seconds.  Pedal pulse 2+.  Bleeding controlled.  Full range of motion to left foot, ankle, toes.  Discussed laceration repair options with patient.  Patient refused sutures and initially any laceration repair options.  Risks and benefits of wound repair discussed with patient.  Patient did eventually agree to getting Steri-Strips.  Patient kept saying she has to go to court tomorrow and needs to prepare for that.  Patient appeared to be oriented and capable of making appropriate decisions.  Wound was cleaned with soap and water prior to arrival, soaked in Hibiclens by nurse in urgent care and irrigated with sterile saline by this provider.  See procedure note above.  Advised patient to observe for signs of infection (not limited to redness, pus discharge, increased swelling).  Tetanus is up-to-date.  *Patient refused any treatment of her back during this visit states \"I will come back another time\".    Advised patient to return to ED/UC for worsening or concerning symptoms.  Follow-up with PCP as needed.  Patient verbalized understanding and agreeable plan of care.    I have " template and all pertinent parts of the review of systems social, family history, surgical history and medication list    Physical Examination:  Vitals:    08/15/22 0910   BP: 164/80      General Appearance:   Well developed, well nourished, well groomed, alert, and cooperative.  Neurological examination:  Neurologic Exam  Vital signs were reviewed and documented in the chart  Patient appeared in good neurologic function with normal comprehension fluent speech  Mood and affect are normal  Sense of smell deferred  COVID mass left in place    Muscle bulk and tone normal  5 out of 5 strength no motor drift  Gait normal intact  Negative Romberg  No clonus long tract signs or myelopathy    Reflexes symmetric 2+ bilaterally  1+ edema noted and extremities skin appears normal  Stigmata arthritis in the wrists hands      Straight leg raise sign absent  Shoulders are without significant intrinsic dysfunction she is got good pulses    Bilateral positive Tinel's at the elbows and wrists        Review of Imaging/DATA:  I personally reviewed an MRI of the neck it shows multilevel degenerative changes with what I would classify as mild central encroachment of the canal there is no evidence of egregious disc herniation EMG nerve conduction study demonstrates what was read out as mild carpal tunnel she has had prior EMG nerve conduction studies that being said her velocities are quite diminished less than 55  Diagnoses/Plan:    Ms. Bailey is a 47 y.o. female   1.  Cervical spondylosis  2.  Bilateral carpal tunnel worse on the right EMG nerve conduction study shows mild to moderate disease  3.  Diabetes  4.  Recent tendon disruption right lower extremity after fall    I explained the risk benefits and expected outcome of major elective surgery for their problem, complications from approach, and infection, the risk of neurologic implications after surgery as well as need for repeat surgeries and most importantly failure to achieve  quality of life improvement from the surgery to the patient.    I explained that I am more than happy to perform surgery on her I explained that it may not be curative given the longstanding nature of this.  It is understandable that she is frustrated I am more than happy to see her back she lives with carpal wrist splints and I explained that in the absence of atrophy weakness etc. that I did not ascertain this is a relatively benign disease    I think it is reasonable to rehabilitate her try to try some physical therapy as I do agree that she has arthritis in the neck I explained that some of her symptoms such as her shoulder pain right anterior pectoral pain and elbow pain would unlikely be assisted with carpal tunnel surgery    I have made a referral to pain management as well as to hand surgeon that would consider injections in the wrist as a temporizing measure given her frustration    I be happy to reevaluate her for surgical options if she continues to fail once she gets her right-sided ankle healed up    A pleasure about neurosurgical care   reviewed the nursing notes.    I have reviewed the findings, diagnosis, plan and need for follow up with the patient.      Final diagnoses:   Laceration of left foot, initial encounter       4/20/2020   [facility]     Karen Boyce CNP  04/20/20 [id number]

## 2022-08-16 ENCOUNTER — PATIENT ROUNDING (BHMG ONLY) (OUTPATIENT)
Dept: NEUROSURGERY | Facility: CLINIC | Age: 47
End: 2022-08-16

## 2022-08-16 NOTE — PROGRESS NOTES
A MY-CHART MESSAGE HAS BEEN SENT TO THE PATIENT FOR PATIENT ROUNDING WITH [facility].

## 2022-12-31 ENCOUNTER — HOSPITAL ENCOUNTER (EMERGENCY)
Facility: HOSPITAL | Age: 47
Discharge: HOME | End: 2022-12-31
Attending: EMERGENCY MEDICINE | Admitting: EMERGENCY MEDICINE
Payer: COMMERCIAL

## 2022-12-31 ENCOUNTER — APPOINTMENT (OUTPATIENT)
Dept: CT IMAGING | Facility: HOSPITAL | Age: 47
End: 2022-12-31
Payer: COMMERCIAL

## 2022-12-31 VITALS
HEIGHT: 62 IN | SYSTOLIC BLOOD PRESSURE: 159 MMHG | WEIGHT: 256 LBS | TEMPERATURE: 98.8 F | BODY MASS INDEX: 47.11 KG/M2 | RESPIRATION RATE: 18 BRPM | OXYGEN SATURATION: 95 % | HEART RATE: 99 BPM | DIASTOLIC BLOOD PRESSURE: 98 MMHG

## 2022-12-31 DIAGNOSIS — N30.00 ACUTE CYSTITIS WITHOUT HEMATURIA: ICD-10-CM

## 2022-12-31 DIAGNOSIS — R10.84 GENERALIZED ABDOMINAL PAIN: Primary | ICD-10-CM

## 2022-12-31 LAB
ALBUMIN SERPL-MCNC: 4.9 G/DL (ref 3.5–5.2)
ALBUMIN/GLOB SERPL: 1.6 G/DL
ALP SERPL-CCNC: 125 U/L (ref 39–117)
ALT SERPL W P-5'-P-CCNC: 39 U/L (ref 1–33)
ANION GAP SERPL CALCULATED.3IONS-SCNC: 13 MMOL/L (ref 5–15)
AST SERPL-CCNC: 30 U/L (ref 1–32)
BACTERIA UR QL AUTO: ABNORMAL /HPF
BASOPHILS # BLD AUTO: 0.05 10*3/MM3 (ref 0–0.2)
BASOPHILS NFR BLD AUTO: 0.6 % (ref 0–1.5)
BILIRUB SERPL-MCNC: 0.6 MG/DL (ref 0–1.2)
BILIRUB UR QL STRIP: NEGATIVE
BUN SERPL-MCNC: 11 MG/DL (ref 6–20)
BUN/CREAT SERPL: 10.8 (ref 7–25)
CALCIUM SPEC-SCNC: 9.5 MG/DL (ref 8.6–10.5)
CHLORIDE SERPL-SCNC: 101 MMOL/L (ref 98–107)
CLARITY UR: ABNORMAL
CO2 SERPL-SCNC: 25 MMOL/L (ref 22–29)
COLOR UR: YELLOW
CREAT SERPL-MCNC: 1.02 MG/DL (ref 0.57–1)
D-LACTATE SERPL-SCNC: 2 MMOL/L (ref 0.5–2)
DEPRECATED RDW RBC AUTO: 43.4 FL (ref 37–54)
EGFRCR SERPLBLD CKD-EPI 2021: 67.6 ML/MIN/1.73
EOSINOPHIL # BLD AUTO: 0.1 10*3/MM3 (ref 0–0.4)
EOSINOPHIL NFR BLD AUTO: 1.3 % (ref 0.3–6.2)
ERYTHROCYTE [DISTWIDTH] IN BLOOD BY AUTOMATED COUNT: 13.6 % (ref 12.3–15.4)
GLOBULIN UR ELPH-MCNC: 3.1 GM/DL
GLUCOSE SERPL-MCNC: 145 MG/DL (ref 65–99)
GLUCOSE UR STRIP-MCNC: NEGATIVE MG/DL
HCT VFR BLD AUTO: 40.9 % (ref 34–46.6)
HGB BLD-MCNC: 13.8 G/DL (ref 12–15.9)
HGB UR QL STRIP.AUTO: NEGATIVE
HOLD SPECIMEN: NORMAL
HYALINE CASTS UR QL AUTO: ABNORMAL /LPF
IMM GRANULOCYTES # BLD AUTO: 0.04 10*3/MM3 (ref 0–0.05)
IMM GRANULOCYTES NFR BLD AUTO: 0.5 % (ref 0–0.5)
KETONES UR QL STRIP: ABNORMAL
LEUKOCYTE ESTERASE UR QL STRIP.AUTO: ABNORMAL
LIPASE SERPL-CCNC: 34 U/L (ref 13–60)
LYMPHOCYTES # BLD AUTO: 2.09 10*3/MM3 (ref 0.7–3.1)
LYMPHOCYTES NFR BLD AUTO: 26.3 % (ref 19.6–45.3)
MCH RBC QN AUTO: 29.6 PG (ref 26.6–33)
MCHC RBC AUTO-ENTMCNC: 33.7 G/DL (ref 31.5–35.7)
MCV RBC AUTO: 87.8 FL (ref 79–97)
MONOCYTES # BLD AUTO: 0.48 10*3/MM3 (ref 0.1–0.9)
MONOCYTES NFR BLD AUTO: 6 % (ref 5–12)
NEUTROPHILS NFR BLD AUTO: 5.2 10*3/MM3 (ref 1.7–7)
NEUTROPHILS NFR BLD AUTO: 65.3 % (ref 42.7–76)
NITRITE UR QL STRIP: NEGATIVE
NRBC BLD AUTO-RTO: 0 /100 WBC (ref 0–0.2)
PH UR STRIP.AUTO: 6 [PH] (ref 5–8)
PLATELET # BLD AUTO: 327 10*3/MM3 (ref 140–450)
PMV BLD AUTO: 9.6 FL (ref 6–12)
POTASSIUM SERPL-SCNC: 3.5 MMOL/L (ref 3.5–5.2)
PROT SERPL-MCNC: 8 G/DL (ref 6–8.5)
PROT UR QL STRIP: ABNORMAL
RBC # BLD AUTO: 4.66 10*6/MM3 (ref 3.77–5.28)
RBC # UR STRIP: ABNORMAL /HPF
REF LAB TEST METHOD: ABNORMAL
SODIUM SERPL-SCNC: 139 MMOL/L (ref 136–145)
SP GR UR STRIP: 1.02 (ref 1–1.03)
SQUAMOUS #/AREA URNS HPF: ABNORMAL /HPF
UROBILINOGEN UR QL STRIP: ABNORMAL
WBC # UR STRIP: ABNORMAL /HPF
WBC NRBC COR # BLD: 7.96 10*3/MM3 (ref 3.4–10.8)
WHOLE BLOOD HOLD COAG: NORMAL
WHOLE BLOOD HOLD SPECIMEN: NORMAL

## 2022-12-31 PROCEDURE — 83605 ASSAY OF LACTIC ACID: CPT | Performed by: PHYSICIAN ASSISTANT

## 2022-12-31 PROCEDURE — 80053 COMPREHEN METABOLIC PANEL: CPT | Performed by: PHYSICIAN ASSISTANT

## 2022-12-31 PROCEDURE — 87086 URINE CULTURE/COLONY COUNT: CPT | Performed by: PHYSICIAN ASSISTANT

## 2022-12-31 PROCEDURE — 85025 COMPLETE CBC W/AUTO DIFF WBC: CPT | Performed by: PHYSICIAN ASSISTANT

## 2022-12-31 PROCEDURE — 74176 CT ABD & PELVIS W/O CONTRAST: CPT

## 2022-12-31 PROCEDURE — 83690 ASSAY OF LIPASE: CPT | Performed by: PHYSICIAN ASSISTANT

## 2022-12-31 PROCEDURE — 99283 EMERGENCY DEPT VISIT LOW MDM: CPT

## 2022-12-31 PROCEDURE — 81001 URINALYSIS AUTO W/SCOPE: CPT | Performed by: PHYSICIAN ASSISTANT

## 2022-12-31 RX ORDER — CEFDINIR 300 MG/1
300 CAPSULE ORAL 2 TIMES DAILY
Qty: 20 CAPSULE | Refills: 0 | Status: SHIPPED | OUTPATIENT
Start: 2022-12-31 | End: 2023-01-10

## 2022-12-31 NOTE — ED PROVIDER NOTES
Subjective   History of Present Illness  Pt is a 48 yo female presenting to ED with complaints of abdominal pain and constipation. PMHx significant for HTN, Anxiety, Asthma, Depression, Fibromyalgia, Hypothyroidism, Anemia, PCOS and JELANI. Pt reports generalized abdominal pain, bloating and constipation for about a week. She can't recall her last normal bowel movement and has only been passing small amount of liquid stool. She vomited once 3 days ago. She denies fever, chills, CP, SOB, cough or urinary sx. She reports taking pain meds for a few days after carpal tunnel surgery several weeks ago and her bowel movements have not been normal since. She has tried enema's, stool softeners and suppositories. She denies tobacco, drug or ETOH use.      History provided by:  Medical records, patient and significant other      Review of Systems   Constitutional: Negative for chills and fever.   HENT: Negative for congestion, sore throat and trouble swallowing.    Eyes: Negative for visual disturbance.   Respiratory: Negative for cough and shortness of breath.    Cardiovascular: Negative for chest pain and leg swelling.   Gastrointestinal: Positive for abdominal pain, constipation, nausea and vomiting. Negative for diarrhea.   Genitourinary: Negative for difficulty urinating, dysuria, flank pain, hematuria and vaginal bleeding.   Musculoskeletal: Negative for arthralgias and back pain.   Skin: Negative for rash and wound.   Neurological: Negative for dizziness, syncope, weakness, numbness and headaches.   Psychiatric/Behavioral: Negative for confusion.   All other systems reviewed and are negative.      Past Medical History:   Diagnosis Date   • Anxiety    • Asthma    • Claustrophobia    • Depression    • Fibromyalgia    • Hypertension    • Hypothyroid    • Iron deficiency anemia 01/04/2019   • JELANI (obstructive sleep apnea)    • PCOS (polycystic ovarian syndrome)    • PID (pelvic inflammatory disease)        Allergies   Allergen  Reactions   • Phenergan  [Promethazine Hcl] Anaphylaxis   • Promethazine Anaphylaxis   • Iodinated Casein Unknown - Low Severity   • Ondansetron Hives   • Other Itching     steroids   • Sulfa Antibiotics Unknown - High Severity   • Contrast Dye Unknown (See Comments)     Was told from allergy testing  Other reaction(s): Unknown (See Comments)  Was told from allergy testing   • Ibuprofen Itching   • Ketorolac Tromethamine Itching     Other reaction(s): Unknown       Past Surgical History:   Procedure Laterality Date   • APPENDECTOMY  2012   • CHOLECYSTECTOMY     • HYSTERECTOMY  2016    PARTIAL   • PERONEAL TENDON EXPLORATION  2022       Family History   Problem Relation Age of Onset   • Hypertension Mother    • Arthritis Mother    • Hypertension Father    • Diabetes Father    • Asthma Father        Social History     Socioeconomic History   • Marital status:    Tobacco Use   • Smoking status: Never   • Smokeless tobacco: Never   Substance and Sexual Activity   • Alcohol use: No   • Drug use: No   • Sexual activity: Defer           Objective   Physical Exam  Vitals and nursing note reviewed.   Constitutional:       General: She is not in acute distress.     Appearance: She is well-developed. She is obese.   HENT:      Head: Atraumatic.      Nose: Nose normal.   Eyes:      General: Lids are normal.      Conjunctiva/sclera: Conjunctivae normal.      Pupils: Pupils are equal, round, and reactive to light.   Cardiovascular:      Rate and Rhythm: Normal rate and regular rhythm.      Heart sounds: Normal heart sounds.   Pulmonary:      Effort: Pulmonary effort is normal.      Breath sounds: Normal breath sounds. No wheezing.   Abdominal:      General: There is no distension.      Palpations: Abdomen is soft.      Tenderness: There is abdominal tenderness in the suprapubic area, left upper quadrant and left lower quadrant. There is no right CVA tenderness, left CVA tenderness, guarding or rebound.   Musculoskeletal:          General: No tenderness. Normal range of motion.      Cervical back: Normal range of motion and neck supple.   Skin:     General: Skin is warm and dry.      Findings: No erythema or rash.   Neurological:      Mental Status: She is alert and oriented to person, place, and time.      Sensory: No sensory deficit.   Psychiatric:         Speech: Speech normal.         Behavior: Behavior normal.         Procedures           ED Course  ED Course as of 12/31/22 1507   Sat Dec 31, 2022   1223 WBC: 7.96 [RT]   1223 Lactate: 2.0 [RT]   1258 Leukocytes, UA(!): Small (1+) [RT]   1258 WBC, UA(!): 13-20 [RT]   1258 Bacteria, UA(!): 4+ [RT]      ED Course User Index  [RT] Karen Riggs PA      Re-examined patient and discussed results and tx plan. Will dc home and she will f/u with PCP. Will cover with Omnicef for UTI. No findings of significant constipation on CT scan.     Recent Results (from the past 24 hour(s))   Urinalysis With Microscopic If Indicated (No Culture) - Urine, Clean Catch    Collection Time: 12/31/22 11:29 AM    Specimen: Urine, Clean Catch   Result Value Ref Range    Color, UA Yellow Yellow, Straw    Appearance, UA Cloudy (A) Clear    pH, UA 6.0 5.0 - 8.0    Specific Gravity, UA 1.023 1.001 - 1.030    Glucose, UA Negative Negative    Ketones, UA Trace (A) Negative    Bilirubin, UA Negative Negative    Blood, UA Negative Negative    Protein, UA Trace (A) Negative    Leuk Esterase, UA Small (1+) (A) Negative    Nitrite, UA Negative Negative    Urobilinogen, UA 0.2 E.U./dL 0.2 - 1.0 E.U./dL   Urinalysis, Microscopic Only - Urine, Clean Catch    Collection Time: 12/31/22 11:29 AM    Specimen: Urine, Clean Catch   Result Value Ref Range    RBC, UA 7-12 (A) None Seen, 0-2 /HPF    WBC, UA 13-20 (A) None Seen, 0-2 /HPF    Bacteria, UA 4+ (A) None Seen, Trace /HPF    Squamous Epithelial Cells, UA 21-30 (A) None Seen, 0-2 /HPF    Hyaline Casts, UA Unable to determine due to loaded field 0 - 6 /LPF    Methodology  Manual Light Microscopy    Comprehensive Metabolic Panel    Collection Time: 12/31/22 11:35 AM    Specimen: Blood   Result Value Ref Range    Glucose 145 (H) 65 - 99 mg/dL    BUN 11 6 - 20 mg/dL    Creatinine 1.02 (H) 0.57 - 1.00 mg/dL    Sodium 139 136 - 145 mmol/L    Potassium 3.5 3.5 - 5.2 mmol/L    Chloride 101 98 - 107 mmol/L    CO2 25.0 22.0 - 29.0 mmol/L    Calcium 9.5 8.6 - 10.5 mg/dL    Total Protein 8.0 6.0 - 8.5 g/dL    Albumin 4.9 3.5 - 5.2 g/dL    ALT (SGPT) 39 (H) 1 - 33 U/L    AST (SGOT) 30 1 - 32 U/L    Alkaline Phosphatase 125 (H) 39 - 117 U/L    Total Bilirubin 0.6 0.0 - 1.2 mg/dL    Globulin 3.1 gm/dL    A/G Ratio 1.6 g/dL    BUN/Creatinine Ratio 10.8 7.0 - 25.0    Anion Gap 13.0 5.0 - 15.0 mmol/L    eGFR 67.6 >60.0 mL/min/1.73   Lipase    Collection Time: 12/31/22 11:35 AM    Specimen: Blood   Result Value Ref Range    Lipase 34 13 - 60 U/L   Lactic Acid, Plasma    Collection Time: 12/31/22 11:35 AM    Specimen: Blood   Result Value Ref Range    Lactate 2.0 0.5 - 2.0 mmol/L   CBC Auto Differential    Collection Time: 12/31/22 11:35 AM    Specimen: Blood   Result Value Ref Range    WBC 7.96 3.40 - 10.80 10*3/mm3    RBC 4.66 3.77 - 5.28 10*6/mm3    Hemoglobin 13.8 12.0 - 15.9 g/dL    Hematocrit 40.9 34.0 - 46.6 %    MCV 87.8 79.0 - 97.0 fL    MCH 29.6 26.6 - 33.0 pg    MCHC 33.7 31.5 - 35.7 g/dL    RDW 13.6 12.3 - 15.4 %    RDW-SD 43.4 37.0 - 54.0 fl    MPV 9.6 6.0 - 12.0 fL    Platelets 327 140 - 450 10*3/mm3    Neutrophil % 65.3 42.7 - 76.0 %    Lymphocyte % 26.3 19.6 - 45.3 %    Monocyte % 6.0 5.0 - 12.0 %    Eosinophil % 1.3 0.3 - 6.2 %    Basophil % 0.6 0.0 - 1.5 %    Immature Grans % 0.5 0.0 - 0.5 %    Neutrophils, Absolute 5.20 1.70 - 7.00 10*3/mm3    Lymphocytes, Absolute 2.09 0.70 - 3.10 10*3/mm3    Monocytes, Absolute 0.48 0.10 - 0.90 10*3/mm3    Eosinophils, Absolute 0.10 0.00 - 0.40 10*3/mm3    Basophils, Absolute 0.05 0.00 - 0.20 10*3/mm3    Immature Grans, Absolute 0.04 0.00 - 0.05  10*3/mm3    nRBC 0.0 0.0 - 0.2 /100 WBC   Green Top (Gel)    Collection Time: 12/31/22 11:35 AM   Result Value Ref Range    Extra Tube Hold for add-ons.    Lavender Top    Collection Time: 12/31/22 11:35 AM   Result Value Ref Range    Extra Tube hold for add-on    Gold Top - SST    Collection Time: 12/31/22 11:35 AM   Result Value Ref Range    Extra Tube Hold for add-ons.    Light Blue Top    Collection Time: 12/31/22 11:35 AM   Result Value Ref Range    Extra Tube Hold for add-ons.      Note: In addition to lab results from this visit, the labs listed above may include labs taken at another facility or during a different encounter within the last 24 hours. Please correlate lab times with ED admission and discharge times for further clarification of the services performed during this visit.    CT Abdomen Pelvis Without Contrast   Final Result   No acute findings are present in the abdomen and pelvis.       This report was finalized on 12/31/2022 12:50 PM by Ramin Hackett.            Vitals:    12/31/22 1056   BP: 159/98   Pulse: 99   Resp: 18   Temp: 98.8 °F (37.1 °C)   TempSrc: Oral   SpO2: 95%   Weight: 116 kg (256 lb)   Height: 157.5 cm (62\")     Medications - No data to display  ECG/EMG Results (last 24 hours)     ** No results found for the last 24 hours. **        No orders to display       DISCHARGE    Patient discharged in stable condition.    Reviewed implications of results, diagnosis, meds, responsibility to follow up, warning signs and symptoms of possible worsening, potential complications and reasons to return to ER.    Patient/Family voiced understanding of above instructions.    Discussed plan for discharge, as there is no emergent indication for admission.  Pt/family is agreeable and understands need for follow up and possible repeat testing.  Pt/family is aware that discharge does not mean that nothing is wrong but that it indicates no emergency is currently present that requires admission and  they must continue care with follow-up as given below or with a physician of their choice.     FOLLOW-UP  Dre Ann MD  [address]  [place] [id number] [phone]    Schedule an appointment as soon as possible for a visit       [facility] Emergency Department  [address] [address] [phone]    If symptoms worsen         Medication List      New Prescriptions    cefdinir 300 MG capsule  Commonly known as: OMNICEF  Take 1 capsule by mouth 2 (Two) Times a Day for 10 days.           Where to Get Your Medications      These medications were sent to [facility]/pharmacy #[id number] - [place], [place] - [address] AT [facility] - [phone]  - [phone]   [address] [facility] [address]    Phone: [phone]   · cefdinir 300 MG capsule                                            Medical Decision Making  Patient is a 48 yo female presenting to ED with complaints of abdominal pain and constipation. Labs unremarkable except for UTI and will cover with Omnicef. CT abd/pelvis without emergent findings and no significant constipation.     Acute cystitis without hematuria: complicated acute illness or injury     Details: Treated with Omnicef  Generalized abdominal pain: complicated acute illness or injury  Amount and/or Complexity of Data Reviewed  Independent Historian: parent and friend  External Data Reviewed:      Details: Reviewed old records including prior PCP, [facility] and Neurosurgery notes  Labs: ordered. Decision-making details documented in ED Course.  Radiology: ordered. Decision-making details documented in ED Course.  ECG/medicine tests:  Decision-making details documented in ED Course.      Risk  Prescription drug management.          Final diagnoses:   Generalized abdominal pain   Acute cystitis without hematuria       ED Disposition  ED Disposition     ED Disposition   Discharge    Condition   Stable    Comment   --              Dre Ann MD  [address]  [place] [id number] [phone]    Schedule an appointment as soon as possible for a visit       [facility]  [address] [address] [phone]    If symptoms worsen         Medication List      New Prescriptions    cefdinir 300 MG capsule  Commonly known as: OMNICEF  Take 1 capsule by mouth 2 (Two) Times a Day for 10 days.           Where to Get Your Medications      These medications were sent to [facility]/pharmacy #[id number] - [place], [place] - [address] [place] [address] AT [facility] [place] - [phone]  - [phone] [address] [address]    Phone: [phone]   · cefdinir 300 MG capsule          Karen Riggs PA  12/31/22 1506

## 2023-01-01 LAB — BACTERIA SPEC AEROBE CULT: NORMAL

## 2024-03-07 ENCOUNTER — APPOINTMENT (OUTPATIENT)
Dept: GENERAL RADIOLOGY | Facility: HOSPITAL | Age: 49
End: 2024-03-07
Payer: COMMERCIAL

## 2024-03-07 ENCOUNTER — HOSPITAL ENCOUNTER (EMERGENCY)
Facility: HOSPITAL | Age: 49
Discharge: HOME | End: 2024-03-07
Attending: EMERGENCY MEDICINE
Payer: COMMERCIAL

## 2024-03-07 VITALS
OXYGEN SATURATION: 96 % | BODY MASS INDEX: 38.09 KG/M2 | HEART RATE: 72 BPM | HEIGHT: 62 IN | RESPIRATION RATE: 22 BRPM | SYSTOLIC BLOOD PRESSURE: 116 MMHG | TEMPERATURE: 98.1 F | DIASTOLIC BLOOD PRESSURE: 78 MMHG | WEIGHT: 207 LBS

## 2024-03-07 DIAGNOSIS — J06.9 VIRAL UPPER RESPIRATORY INFECTION: ICD-10-CM

## 2024-03-07 DIAGNOSIS — R13.10 DYSPHAGIA, UNSPECIFIED TYPE: Primary | ICD-10-CM

## 2024-03-07 LAB
ALBUMIN SERPL-MCNC: 4.4 G/DL (ref 3.5–5.2)
ALBUMIN/GLOB SERPL: 1.4 G/DL
ALP SERPL-CCNC: 95 U/L (ref 39–117)
ALT SERPL W P-5'-P-CCNC: 20 U/L (ref 1–33)
ANION GAP SERPL CALCULATED.3IONS-SCNC: 10 MMOL/L (ref 5–15)
AST SERPL-CCNC: 16 U/L (ref 1–32)
BASOPHILS # BLD AUTO: 0.06 10*3/MM3 (ref 0–0.2)
BASOPHILS NFR BLD AUTO: 0.5 % (ref 0–1.5)
BILIRUB SERPL-MCNC: <0.2 MG/DL (ref 0–1.2)
BUN SERPL-MCNC: 25 MG/DL (ref 6–20)
BUN/CREAT SERPL: 26.9 (ref 7–25)
CALCIUM SPEC-SCNC: 9.5 MG/DL (ref 8.6–10.5)
CHLORIDE SERPL-SCNC: 98 MMOL/L (ref 98–107)
CO2 SERPL-SCNC: 26 MMOL/L (ref 22–29)
CREAT SERPL-MCNC: 0.93 MG/DL (ref 0.57–1)
DEPRECATED RDW RBC AUTO: 40.9 FL (ref 37–54)
EGFRCR SERPLBLD CKD-EPI 2021: 74.6 ML/MIN/1.73
EOSINOPHIL # BLD AUTO: 0.14 10*3/MM3 (ref 0–0.4)
EOSINOPHIL NFR BLD AUTO: 1.2 % (ref 0.3–6.2)
ERYTHROCYTE [DISTWIDTH] IN BLOOD BY AUTOMATED COUNT: 13.2 % (ref 12.3–15.4)
GLOBULIN UR ELPH-MCNC: 3.1 GM/DL
GLUCOSE SERPL-MCNC: 102 MG/DL (ref 65–99)
HCT VFR BLD AUTO: 38.6 % (ref 34–46.6)
HGB BLD-MCNC: 13.2 G/DL (ref 12–15.9)
HOLD SPECIMEN: NORMAL
IMM GRANULOCYTES # BLD AUTO: 0.06 10*3/MM3 (ref 0–0.05)
IMM GRANULOCYTES NFR BLD AUTO: 0.5 % (ref 0–0.5)
LYMPHOCYTES # BLD AUTO: 3.93 10*3/MM3 (ref 0.7–3.1)
LYMPHOCYTES NFR BLD AUTO: 32.9 % (ref 19.6–45.3)
MCH RBC QN AUTO: 29.2 PG (ref 26.6–33)
MCHC RBC AUTO-ENTMCNC: 34.2 G/DL (ref 31.5–35.7)
MCV RBC AUTO: 85.4 FL (ref 79–97)
MONOCYTES # BLD AUTO: 0.72 10*3/MM3 (ref 0.1–0.9)
MONOCYTES NFR BLD AUTO: 6 % (ref 5–12)
NEUTROPHILS NFR BLD AUTO: 58.9 % (ref 42.7–76)
NEUTROPHILS NFR BLD AUTO: 7.02 10*3/MM3 (ref 1.7–7)
NRBC BLD AUTO-RTO: 0 /100 WBC (ref 0–0.2)
NT-PROBNP SERPL-MCNC: 57.7 PG/ML (ref 0–900)
PLATELET # BLD AUTO: 290 10*3/MM3 (ref 140–450)
PMV BLD AUTO: 9.3 FL (ref 6–12)
POTASSIUM SERPL-SCNC: 3.7 MMOL/L (ref 3.5–5.2)
PROT SERPL-MCNC: 7.5 G/DL (ref 6–8.5)
QT INTERVAL: 408 MS
QTC INTERVAL: 465 MS
RBC # BLD AUTO: 4.52 10*6/MM3 (ref 3.77–5.28)
SODIUM SERPL-SCNC: 134 MMOL/L (ref 136–145)
TROPONIN T SERPL HS-MCNC: <6 NG/L
WBC NRBC COR # BLD AUTO: 11.93 10*3/MM3 (ref 3.4–10.8)
WHOLE BLOOD HOLD COAG: NORMAL
WHOLE BLOOD HOLD SPECIMEN: NORMAL

## 2024-03-07 PROCEDURE — 74220 X-RAY XM ESOPHAGUS 1CNTRST: CPT

## 2024-03-07 PROCEDURE — 93005 ELECTROCARDIOGRAM TRACING: CPT

## 2024-03-07 PROCEDURE — 93005 ELECTROCARDIOGRAM TRACING: CPT | Performed by: EMERGENCY MEDICINE

## 2024-03-07 PROCEDURE — 71045 X-RAY EXAM CHEST 1 VIEW: CPT

## 2024-03-07 PROCEDURE — 80053 COMPREHEN METABOLIC PANEL: CPT

## 2024-03-07 PROCEDURE — 99284 EMERGENCY DEPT VISIT MOD MDM: CPT

## 2024-03-07 PROCEDURE — 83880 ASSAY OF NATRIURETIC PEPTIDE: CPT

## 2024-03-07 PROCEDURE — 85025 COMPLETE CBC W/AUTO DIFF WBC: CPT

## 2024-03-07 PROCEDURE — 84484 ASSAY OF TROPONIN QUANT: CPT

## 2024-03-07 PROCEDURE — 99285 EMERGENCY DEPT VISIT HI MDM: CPT

## 2024-03-07 RX ORDER — PREDNISONE 50 MG/1
50 TABLET ORAL DAILY
Qty: 5 TABLET | Refills: 0 | Status: SHIPPED | OUTPATIENT
Start: 2024-03-07

## 2024-03-07 RX ORDER — SODIUM CHLORIDE 0.9 % (FLUSH) 0.9 %
10 SYRINGE (ML) INJECTION AS NEEDED
Status: DISCONTINUED | OUTPATIENT
Start: 2024-03-07 | End: 2024-03-07 | Stop reason: HOSPADM

## 2024-03-07 RX ADMIN — BARIUM SULFATE 183 ML: 960 POWDER, FOR SUSPENSION ORAL at 08:29

## 2024-03-07 NOTE — DISCHARGE INSTRUCTIONS
Follow-up with GI.  I have referred you.  Call today to make an appointment.  Alternatively, feel free to follow-up with your GI physician as we discussed in great detail at the bedside.    Take steroids as prescribed.    Return to the emergency department immediately for new or changing concerns.    Call to make an appointment with your PCP.  Follow-up in the next week.    Please review the medications you are supposed to be taking according to prior physician recommendations. I have not changed your home medications during this visit. If your discharge instructions indicate that I have changed your home medications, this is not the case, and you should disregard. If you have any questions about the medication you should be taking at home, please call your physician.

## 2024-03-07 NOTE — ED PROVIDER NOTES
"Subjective   History of Present Illness  This patient is a very tearful 49-year-old  female who came in last night for difficulty swallowing and breathing since Saturday.  Patient states that she has been sick for couple of weeks.  She went to see her primary care physician who she states \"just felt around my neck.\"  She evidently did not have any studies performed.  She went to an urgent treatment center and ultimately had a COVID-19 and strep screen both of which were negative.  Patient was placed on steroids and states that her symptoms did get somewhat better.  She tells me she is very hungry but is having difficulty eating because of discomfort with swallowing.  She is handling her secretions without great difficulty.  She is tearful, she tells me she continues to have coughing.  She tells me she has had no hemoptysis or hematemesis.  No recent weight loss.  No history of neck cancer.  Patient tells me she has a history of anxiety, claustrophobia, depression, fibromyalgia, hypertension, obstructive sleep apnea, PCOS and PID.  Patient is quite frustrated when I walked into the room.  She tells me she had to wait 2 and half hours before I saw her.  I told her that I had just arrived for a dayshift and certainly understood and was empathetic about her weight and apologized that the emergency department was so busy the night before.    Patient is here with a very kind and understanding .  He confirmed the aforementioned stories.  Patient denies any chest pain.  She denies any productive cough.  She denies any ripping or tearing sensation.  Denies abdominal pain.  Denies any diarrhea.    In summary, we have a 49-year-old female with a sore throat and difficulty swallowing over the last several days with a negative strep screen and COVID test as an outpatient and with a recent dose of steroids which helped.  She comes in for evaluation.  She tells me she was told that she had \"an esophageal problem,\" " but that she has not been referred to GI.  Patient tells me she has a GI physician, , and had a colonoscopy in the past, but has not called to follow-up with him regarding this difficulty with swallowing.    Past medical history  Asthma, depression, hypertension, PCOS, hypothyroidism, anxiety, PID, fibromyalgia    Family history  Hypertension, mom and dad      Review of Systems   Constitutional: Negative.  Negative for chills, fatigue, fever and unexpected weight change.   HENT:  Positive for sore throat and trouble swallowing. Negative for dental problem, ear pain, hearing loss, rhinorrhea and sinus pressure.    Eyes:  Negative for pain and visual disturbance.   Respiratory:  Positive for shortness of breath. Negative for chest tightness.    Cardiovascular:  Negative for chest pain, palpitations and leg swelling.   Gastrointestinal:  Negative for blood in stool, diarrhea, nausea and vomiting.   Genitourinary:  Negative for difficulty urinating, dysuria, frequency, hematuria and urgency.   Musculoskeletal:  Negative for myalgias, neck pain and neck stiffness.   Neurological:  Negative for seizures, syncope, speech difficulty, light-headedness and headaches.   Psychiatric/Behavioral:  Negative for confusion.    All other systems reviewed and are negative.      Past Medical History:   Diagnosis Date    Anxiety     Asthma     Claustrophobia     Depression     Fibromyalgia     Hypertension     Hypothyroid     Iron deficiency anemia 01/04/2019    JELANI (obstructive sleep apnea)     PCOS (polycystic ovarian syndrome)     PID (pelvic inflammatory disease)        Allergies   Allergen Reactions    Phenergan  [Promethazine Hcl] Anaphylaxis    Promethazine Anaphylaxis    Iodinated Casein Unknown - Low Severity    Ondansetron Hives    Other Itching     steroids    Contrast Dye (Echo Or Unknown Ct/Mr) Unknown (See Comments)     Was told from allergy testing  Other reaction(s): Unknown (See Comments)  Was told from allergy  testing    Ibuprofen Itching    Ketorolac Tromethamine Itching     Other reaction(s): Unknown       Past Surgical History:   Procedure Laterality Date    APPENDECTOMY  2012    CARPAL TUNNEL RELEASE Right 12/02/2022    CHOLECYSTECTOMY      HYSTERECTOMY  2016    PARTIAL    PERONEAL TENDON EXPLORATION  2022       Family History   Problem Relation Age of Onset    Hypertension Mother     Arthritis Mother     Hypertension Father     Diabetes Father     Asthma Father        Social History     Socioeconomic History    Marital status:    Tobacco Use    Smoking status: Never    Smokeless tobacco: Never   Substance and Sexual Activity    Alcohol use: No    Drug use: No    Sexual activity: Defer           Objective   Physical Exam  Vitals and nursing note reviewed.   Constitutional:       General: She is not in acute distress.     Appearance: She is well-developed. She is not toxic-appearing.      Comments: Patient tearful, quite frustrated verbally with wait time and lack of diagnosis to date   HENT:      Head: Normocephalic and atraumatic.      Jaw: No trismus.      Right Ear: Ear canal and external ear normal.      Left Ear: Ear canal and external ear normal.      Nose: Nose normal.      Mouth/Throat:      Mouth: Mucous membranes are moist. No oral lesions.      Dentition: No dental abscesses.      Pharynx: Oropharynx is clear. No posterior oropharyngeal erythema or uvula swelling.      Tonsils: No tonsillar exudate or tonsillar abscesses.      Comments: No uvular deviation.  No signs of a retropharyngeal or peritonsillar abscess.  No asymmetric swelling.  Very mild erythema only.  No exudate.  No trismus or malocclusion noted.  Eyes:      General:         Right eye: No discharge.         Left eye: No discharge.      Extraocular Movements: Extraocular movements intact.      Conjunctiva/sclera: Conjunctivae normal.      Right eye: Right conjunctiva is not injected.      Left eye: Left conjunctiva is not injected.       Pupils: Pupils are equal, round, and reactive to light.   Neck:      Vascular: No JVD.      Trachea: No tracheal tenderness.   Cardiovascular:      Rate and Rhythm: Normal rate and regular rhythm.      Heart sounds: Normal heart sounds.      No friction rub. No gallop.   Pulmonary:      Effort: Pulmonary effort is normal.      Breath sounds: Normal breath sounds. No wheezing or rales.   Chest:      Chest wall: No tenderness.   Abdominal:      General: Bowel sounds are normal. There is no distension.      Palpations: Abdomen is soft. Abdomen is not rigid. There is no mass or pulsatile mass.      Tenderness: There is no abdominal tenderness. There is no guarding or rebound. Negative signs include McBurney's sign.      Comments: No signs of acute abdomen.  No pain at McBurney's point.  No pulsatile abdominal mass.   Musculoskeletal:         General: No tenderness or deformity. Normal range of motion.      Cervical back: Normal range of motion and neck supple. No rigidity. Normal range of motion.   Lymphadenopathy:      Cervical: No cervical adenopathy.   Skin:     General: Skin is warm and dry.      Capillary Refill: Capillary refill takes less than 2 seconds.      Findings: No erythema or rash.      Comments: No diaphoresis, lesions, nevi, petechia, purpura   Neurological:      Mental Status: She is alert and oriented to person, place, and time.      Cranial Nerves: No cranial nerve deficit.      Sensory: No sensory deficit.      Motor: No tremor or abnormal muscle tone.      Comments: 5/5 strength bilaterally with flexion and extension of fingers, wrist, elbows, knees and hips as well as plantar and dorsiflexion of the foot.   Psychiatric:         Attention and Perception: She is attentive.         Speech: Speech normal.         Behavior: Behavior normal.         Thought Content: Thought content normal.         Judgment: Judgment normal.         Procedures           ED Course  ED Course as of 03/07/24 1433   Thu Mar  "07, 2024   0626 As mentioned in the HPI, patient was very frustrated.  She stated \"this is the worst experience I have ever had at [facility].\"  I told her I was very apologetic and felt bad that she was of this opinion.  She indicated this has nothing to do with provider care, as I had only been in the room for 5 or 10 minutes.  It sounds like she is very frustrated regarding the 2 and half hour wait time as well as the fact that she believes it will take her quite some time to see GI as an outpatient. [ROSA]   0627 I talked the patient through all of her labs including her normal troponin, normal BNP, normal CMP and CBC with the exception of a white count of 11.93, sodium 134, BUN of 25 and glucose of 102.  We talked about her normal chest x-ray, and her EKG.  I interpreted her EKG independently.  Her EKG shows normal sinus rhythm with low voltage and a rate of 78 with no evidence of dynamic ST segment changes. [ROSA]   0628 Patient has no difficulty with phonation.  She is handling her secretions well.  She simply states \"I am so hungry and I want to eat but it is hard to eat.\"  I told the patient I would like to move forward with an esophagram/barium swallow.  I told her that as she was quite frustrated with waiting so long, I wanted to be respectful of her wishes.  I told her that I would highly recommend getting the study done but understood if she wanted to go home to follow-up with GI.  I do believe outpatient GI makes a great deal of sense.  The patient asked me when she would be able to follow-up and I told her that I had no way of knowing exactly when our outpatient team or the GI physician of her choosing would be available but recommended calling them when the office opens up today.  I told her I would refer her personally to the on-call GI doctor but also recommended following up with the GI team here in [place], , who cared for the patient previously. [ROSA]   0629 We discussed the differential " "diagnosis and medical decision making in great detail.  We have talked about cardiac etiologies including acute coronary syndrome, we have discussed retropharyngeal abscess, peritonsillar abscess, viral etiology, strep throat, mass/cancer, pneumonia, and other etiologies.  The patient's oropharyngeal exam is completely normal.  She has a very slight erythema and has reported negative outpatient strep study.  She has been placed on steroids and states that this helped slightly.  Patient tells me she was already checked for COVID and it was negative.  Patient is quite frustrated here and tearful.  I have asked several times if there is anything I can do to help her and she states \"I does want my IV out so I can go home.\" [ROSA]   0630 At this point, I am hopeful the patient will stay for a barium swallow/esophagram.  I plan to discuss the case with GI and told the patient several times I was happy to do anything possible to ensure we take good care of her.  Unfortunately, I believe the patient's wait time and her understandable frustration with her symptoms and lack of perceived help/assistance at the 2 prior physicians visited are weighing heavily on her presentation today. [ROSA]   0882 I saw the patient personally at approximately 8 AM Eastern time, just after completion of esophagram.  She tells me she tolerated the procedure well.  She looks much better clinically.  She was no longer tearful.  I told her I would go review images which I have done.  I do not see any evidence of obvious mechanical obstruction.  Awaiting official radiologic read. [ROSA]   0825 I discussed the case with SANDIE Krishna, at approximately 9:15 AM Eastern time.  He reviewed the esophagram with me.  He agreed with my independent interpretation of no evidence of mechanical obstruction.  We discussed outpatient follow-up with either ENT or GI.  He told he was more than happy to see the patient and agreed with the plan for outpatient steroids. [ROSA] "   0974 I would back to the patient's bedside at approximately 9:25 AM Eastern time and found the patient resting comfortably.  She was no longer tearful and we had a very nice conversation.  She really liked the plan to put her on 5 more days of steroids and to follow-up with GI as needed.  I told her I would refer her to Dr. Jamie Solares and have done so.  I would like her to follow-up with her PCP.  We will give her 5 days of prednisone burst and I told her to come back immediately for difficulty swallowing that gets worse, difficulty breathing, or new concerns.  Patient is impression will include dysphagia, respiratory infection/viral.  Plan include steroids, mandatory follow-up with PCP tomorrow or Monday.  Follow-up Dr. Solares as needed in the next week.  Steroids for 5 days and return here for new concerns. [ROSA]      ED Course User Index  [ROSA] Brenton Weems MD      Recent Results (from the past 24 hour(s))   ECG 12 Lead ED Triage Standing Order; SOA    Collection Time: 03/07/24  3:46 AM   Result Value Ref Range    QT Interval 408 ms    QTC Interval 465 ms   Comprehensive Metabolic Panel    Collection Time: 03/07/24  4:02 AM    Specimen: Blood   Result Value Ref Range    Glucose 102 (H) 65 - 99 mg/dL    BUN 25 (H) 6 - 20 mg/dL    Creatinine 0.93 0.57 - 1.00 mg/dL    Sodium 134 (L) 136 - 145 mmol/L    Potassium 3.7 3.5 - 5.2 mmol/L    Chloride 98 98 - 107 mmol/L    CO2 26.0 22.0 - 29.0 mmol/L    Calcium 9.5 8.6 - 10.5 mg/dL    Total Protein 7.5 6.0 - 8.5 g/dL    Albumin 4.4 3.5 - 5.2 g/dL    ALT (SGPT) 20 1 - 33 U/L    AST (SGOT) 16 1 - 32 U/L    Alkaline Phosphatase 95 39 - 117 U/L    Total Bilirubin <0.2 0.0 - 1.2 mg/dL    Globulin 3.1 gm/dL    A/G Ratio 1.4 g/dL    BUN/Creatinine Ratio 26.9 (H) 7.0 - 25.0    Anion Gap 10.0 5.0 - 15.0 mmol/L    eGFR 74.6 >60.0 mL/min/1.73   BNP    Collection Time: 03/07/24  4:02 AM    Specimen: Blood   Result Value Ref Range    proBNP 57.7 0.0 - 900.0 pg/mL   Single High  Sensitivity Troponin T    Collection Time: 03/07/24  4:02 AM    Specimen: Blood   Result Value Ref Range    HS Troponin T <6 <14 ng/L   Green Top (Gel)    Collection Time: 03/07/24  4:02 AM   Result Value Ref Range    Extra Tube Hold for add-ons.    Lavender Top    Collection Time: 03/07/24  4:02 AM   Result Value Ref Range    Extra Tube hold for add-on    Gold Top - SST    Collection Time: 03/07/24  4:02 AM   Result Value Ref Range    Extra Tube Hold for add-ons.    Gray Top    Collection Time: 03/07/24  4:02 AM   Result Value Ref Range    Extra Tube Hold for add-ons.    Light Blue Top    Collection Time: 03/07/24  4:02 AM   Result Value Ref Range    Extra Tube Hold for add-ons.    CBC Auto Differential    Collection Time: 03/07/24  4:02 AM    Specimen: Blood   Result Value Ref Range    WBC 11.93 (H) 3.40 - 10.80 10*3/mm3    RBC 4.52 3.77 - 5.28 10*6/mm3    Hemoglobin 13.2 12.0 - 15.9 g/dL    Hematocrit 38.6 34.0 - 46.6 %    MCV 85.4 79.0 - 97.0 fL    MCH 29.2 26.6 - 33.0 pg    MCHC 34.2 31.5 - 35.7 g/dL    RDW 13.2 12.3 - 15.4 %    RDW-SD 40.9 37.0 - 54.0 fl    MPV 9.3 6.0 - 12.0 fL    Platelets 290 140 - 450 10*3/mm3    Neutrophil % 58.9 42.7 - 76.0 %    Lymphocyte % 32.9 19.6 - 45.3 %    Monocyte % 6.0 5.0 - 12.0 %    Eosinophil % 1.2 0.3 - 6.2 %    Basophil % 0.5 0.0 - 1.5 %    Immature Grans % 0.5 0.0 - 0.5 %    Neutrophils, Absolute 7.02 (H) 1.70 - 7.00 10*3/mm3    Lymphocytes, Absolute 3.93 (H) 0.70 - 3.10 10*3/mm3    Monocytes, Absolute 0.72 0.10 - 0.90 10*3/mm3    Eosinophils, Absolute 0.14 0.00 - 0.40 10*3/mm3    Basophils, Absolute 0.06 0.00 - 0.20 10*3/mm3    Immature Grans, Absolute 0.06 (H) 0.00 - 0.05 10*3/mm3    nRBC 0.0 0.0 - 0.2 /100 WBC     Note: In addition to lab results from this visit, the labs listed above may include labs taken at another facility or during a different encounter within the last 24 hours. Please correlate lab times with ED admission and discharge times for further  "clarification of the services performed during this visit.    XR Chest 1 View   Final Result   Impression:   No acute cardiopulmonary abnormality.            Electronically Signed: Pedrito Canales MD     3/7/2024 5:16 AM EST     Workstation ID: [id number]      FL ESOPHAGRAM SINGLE CONTRAST    (Results Pending)     Vitals:    03/07/24 0335 03/07/24 0553 03/07/24 0555 03/07/24 0700   BP: 124/89 105/84  116/78   BP Location: Left arm      Patient Position: Sitting      Pulse: 80  71 72   Resp: 22      Temp: 98.1 °F (36.7 °C)      TempSrc: Oral      SpO2: 97%  98% 96%   Weight: 93.9 kg (207 lb)      Height: 157.5 cm (62\")        Medications   barium sulfate (E-Z-PAQUE) 96 % oral suspension reconstituted suspension 183 mL (183 mL Oral Given 3/7/24 0829)     ECG/EMG Results (last 24 hours)       Procedure Component Value Units Date/Time    ECG 12 Lead ED Triage Standing Order; SOA [[id number]] Collected: 03/07/24 0346     Updated: 03/07/24 0626     QT Interval 408 ms      QTC Interval 465 ms     Narrative:      Test Reason : ED Triage Standing Order~  Blood Pressure :   */*   mmHG  Vent. Rate :  78 BPM     Atrial Rate :  78 BPM     P-R Int : 142 ms          QRS Dur :  78 ms      QT Int : 408 ms       P-R-T Axes :  21  17  14 degrees     QTc Int : 465 ms    Normal sinus rhythm  Low voltage QRS  Borderline ECG  When compared with ECG of 18-MAR-2010 21:12,  Nonspecific T wave abnormality no longer evident in Lateral leads  Confirmed by JENELLE FREY MD ([id number]) on 3/7/2024 6:26:05 AM    Referred By:            Confirmed By: JENELLE FREY MD          ECG 12 Lead ED Triage Standing Order; SOA   Final Result   Test Reason : ED Triage Standing Order~   Blood Pressure :   */*   mmHG   Vent. Rate :  78 BPM     Atrial Rate :  78 BPM      P-R Int : 142 ms          QRS Dur :  78 ms       QT Int : 408 ms       P-R-T Axes :  21  17  14 degrees      QTc Int : 465 ms      Normal sinus rhythm   Low voltage QRS   Borderline ECG   When " compared with ECG of 18-MAR-2010 21:12,   Nonspecific T wave abnormality no longer evident in Lateral leads   Confirmed by JENELLE FREY MD ([id number]) on 3/7/2024 6:26:05 AM      Referred By:            Confirmed By: JENELLE FREY MD                                                 Medical Decision Making  Clinical exam would seem to rule out retropharyngeal abscess and peritonsillar abscess.  Patient is handling her secretions well.  No difficulty with phonation or obvious difficulty with dysphagia.  Patient does subjectively complain of dysphagia.  I think an esophageal workup does make sense.  Would consider an esophagram and do believe that the recommendation made to her to for follow-up with GI to be appropriate in the right neck step.  Labs here look really good including cardiac workup.  White count is little high which is not surprising given inflammatory process.  Patient has been sick for couple weeks and the certainly sounds viral or similar/infectious.  I am encouraged by the negative strep screen and COVID test that the patient admits to, but still believe a viral or bacterial etiology makes the most sense given her 2 weeks of upper respiratory symptoms, sore throat, difficulty swallowing.    Problems Addressed:  Dysphagia, unspecified type: complicated acute illness or injury  Viral upper respiratory infection: complicated acute illness or injury    Amount and/or Complexity of Data Reviewed  External Data Reviewed: notes.  Labs: ordered. Decision-making details documented in ED Course.  Radiology: ordered. Decision-making details documented in ED Course.  ECG/medicine tests: ordered.    Risk  Prescription drug management.        Final diagnoses:   Dysphagia, unspecified type   Viral upper respiratory infection       ED Disposition  ED Disposition       ED Disposition   Discharge    Condition   Stable    Comment   --               Dre Ann MD  [address] [place] [id number]  [phone]    In  1 week      Alden Solares MD  [address]  [address]  [place] [id number]  [phone]    In 1 week           Medication List        New Prescriptions      predniSONE 50 MG tablet  Commonly known as: DELTASONE  Take 1 tablet by mouth Daily.               Where to Get Your Medications        These medications were sent to [facility]/pharmacy #[id number] - [place], [place] - [address] [place] [address] AT [facility] - [phone]  - [phone] [address] [address]      Phone: [phone]   predniSONE 50 MG tablet            Brenton Weems MD  03/07/24 6271

## 2025-01-02 ENCOUNTER — HOSPITAL ENCOUNTER (EMERGENCY)
Facility: HOSPITAL | Age: 50
Discharge: HOME | End: 2025-01-02
Attending: EMERGENCY MEDICINE
Payer: COMMERCIAL

## 2025-01-02 ENCOUNTER — APPOINTMENT (OUTPATIENT)
Dept: CT IMAGING | Facility: HOSPITAL | Age: 50
End: 2025-01-02
Payer: COMMERCIAL

## 2025-01-02 VITALS
BODY MASS INDEX: 38.46 KG/M2 | RESPIRATION RATE: 18 BRPM | TEMPERATURE: 98.1 F | HEIGHT: 62 IN | WEIGHT: 209 LBS | SYSTOLIC BLOOD PRESSURE: 125 MMHG | DIASTOLIC BLOOD PRESSURE: 84 MMHG | OXYGEN SATURATION: 96 % | HEART RATE: 87 BPM

## 2025-01-02 DIAGNOSIS — R10.32 ACUTE ABDOMINAL PAIN IN LEFT LOWER QUADRANT: Primary | ICD-10-CM

## 2025-01-02 DIAGNOSIS — R79.89 ABNORMAL LFTS: ICD-10-CM

## 2025-01-02 LAB
ALBUMIN SERPL-MCNC: 4.6 G/DL (ref 3.5–5.2)
ALBUMIN/GLOB SERPL: 1.4 G/DL
ALP SERPL-CCNC: 275 U/L (ref 39–117)
ALT SERPL W P-5'-P-CCNC: 245 U/L (ref 1–33)
ANION GAP SERPL CALCULATED.3IONS-SCNC: 13 MMOL/L (ref 5–15)
APAP SERPL-MCNC: <5 MCG/ML (ref 0–30)
AST SERPL-CCNC: 111 U/L (ref 1–32)
BACTERIA UR QL AUTO: ABNORMAL /HPF
BASOPHILS # BLD AUTO: 0.04 10*3/MM3 (ref 0–0.2)
BASOPHILS NFR BLD AUTO: 0.5 % (ref 0–1.5)
BILIRUB SERPL-MCNC: 0.3 MG/DL (ref 0–1.2)
BILIRUB UR QL STRIP: NEGATIVE
BUN SERPL-MCNC: 27 MG/DL (ref 6–20)
BUN/CREAT SERPL: 32.9 (ref 7–25)
CALCIUM SPEC-SCNC: 10.4 MG/DL (ref 8.6–10.5)
CHLORIDE SERPL-SCNC: 97 MMOL/L (ref 98–107)
CLARITY UR: CLEAR
CO2 SERPL-SCNC: 26 MMOL/L (ref 22–29)
COLOR UR: YELLOW
CREAT SERPL-MCNC: 0.82 MG/DL (ref 0.57–1)
D-LACTATE SERPL-SCNC: 0.8 MMOL/L (ref 0.5–2)
DEPRECATED RDW RBC AUTO: 42.3 FL (ref 37–54)
EGFRCR SERPLBLD CKD-EPI 2021: 86.2 ML/MIN/1.73
EOSINOPHIL # BLD AUTO: 0.14 10*3/MM3 (ref 0–0.4)
EOSINOPHIL NFR BLD AUTO: 1.9 % (ref 0.3–6.2)
ERYTHROCYTE [DISTWIDTH] IN BLOOD BY AUTOMATED COUNT: 13.2 % (ref 12.3–15.4)
GLOBULIN UR ELPH-MCNC: 3.3 GM/DL
GLUCOSE SERPL-MCNC: 111 MG/DL (ref 65–99)
GLUCOSE UR STRIP-MCNC: NEGATIVE MG/DL
HAV IGM SERPL QL IA: NORMAL
HBV CORE IGM SERPL QL IA: NORMAL
HBV SURFACE AG SERPL QL IA: NORMAL
HCT VFR BLD AUTO: 44.8 % (ref 34–46.6)
HCV AB SER QL: NORMAL
HGB BLD-MCNC: 15 G/DL (ref 12–15.9)
HGB UR QL STRIP.AUTO: NEGATIVE
HOLD SPECIMEN: NORMAL
HYALINE CASTS UR QL AUTO: ABNORMAL /LPF
IMM GRANULOCYTES # BLD AUTO: 0.02 10*3/MM3 (ref 0–0.05)
IMM GRANULOCYTES NFR BLD AUTO: 0.3 % (ref 0–0.5)
INR PPP: 0.89 (ref 0.89–1.12)
KETONES UR QL STRIP: NEGATIVE
LEUKOCYTE ESTERASE UR QL STRIP.AUTO: ABNORMAL
LIPASE SERPL-CCNC: 87 U/L (ref 13–60)
LYMPHOCYTES # BLD AUTO: 2.18 10*3/MM3 (ref 0.7–3.1)
LYMPHOCYTES NFR BLD AUTO: 29.7 % (ref 19.6–45.3)
MCH RBC QN AUTO: 29.2 PG (ref 26.6–33)
MCHC RBC AUTO-ENTMCNC: 33.5 G/DL (ref 31.5–35.7)
MCV RBC AUTO: 87.3 FL (ref 79–97)
MONOCYTES # BLD AUTO: 0.54 10*3/MM3 (ref 0.1–0.9)
MONOCYTES NFR BLD AUTO: 7.4 % (ref 5–12)
NEUTROPHILS NFR BLD AUTO: 4.41 10*3/MM3 (ref 1.7–7)
NEUTROPHILS NFR BLD AUTO: 60.2 % (ref 42.7–76)
NITRITE UR QL STRIP: NEGATIVE
NRBC BLD AUTO-RTO: 0 /100 WBC (ref 0–0.2)
PH UR STRIP.AUTO: 5.5 [PH] (ref 5–8)
PLATELET # BLD AUTO: 274 10*3/MM3 (ref 140–450)
PMV BLD AUTO: 9.6 FL (ref 6–12)
POTASSIUM SERPL-SCNC: 4.3 MMOL/L (ref 3.5–5.2)
PROT SERPL-MCNC: 7.9 G/DL (ref 6–8.5)
PROT UR QL STRIP: NEGATIVE
PROTHROMBIN TIME: 12.1 SECONDS (ref 12.2–14.5)
RBC # BLD AUTO: 5.13 10*6/MM3 (ref 3.77–5.28)
RBC # UR STRIP: ABNORMAL /HPF
REF LAB TEST METHOD: ABNORMAL
SODIUM SERPL-SCNC: 136 MMOL/L (ref 136–145)
SP GR UR STRIP: 1.01 (ref 1–1.03)
SQUAMOUS #/AREA URNS HPF: ABNORMAL /HPF
UROBILINOGEN UR QL STRIP: ABNORMAL
WBC # UR STRIP: ABNORMAL /HPF
WBC NRBC COR # BLD AUTO: 7.33 10*3/MM3 (ref 3.4–10.8)
WHOLE BLOOD HOLD COAG: NORMAL
WHOLE BLOOD HOLD SPECIMEN: NORMAL

## 2025-01-02 PROCEDURE — 74176 CT ABD & PELVIS W/O CONTRAST: CPT

## 2025-01-02 PROCEDURE — 25810000003 SODIUM CHLORIDE 0.9 % SOLUTION: Performed by: EMERGENCY MEDICINE

## 2025-01-02 PROCEDURE — 83605 ASSAY OF LACTIC ACID: CPT | Performed by: EMERGENCY MEDICINE

## 2025-01-02 PROCEDURE — 25010000002 MORPHINE PER 10 MG: Performed by: EMERGENCY MEDICINE

## 2025-01-02 PROCEDURE — 83690 ASSAY OF LIPASE: CPT | Performed by: EMERGENCY MEDICINE

## 2025-01-02 PROCEDURE — 96374 THER/PROPH/DIAG INJ IV PUSH: CPT

## 2025-01-02 PROCEDURE — 85025 COMPLETE CBC W/AUTO DIFF WBC: CPT | Performed by: EMERGENCY MEDICINE

## 2025-01-02 PROCEDURE — 85610 PROTHROMBIN TIME: CPT | Performed by: EMERGENCY MEDICINE

## 2025-01-02 PROCEDURE — 80053 COMPREHEN METABOLIC PANEL: CPT | Performed by: EMERGENCY MEDICINE

## 2025-01-02 PROCEDURE — 99284 EMERGENCY DEPT VISIT MOD MDM: CPT

## 2025-01-02 PROCEDURE — 80074 ACUTE HEPATITIS PANEL: CPT | Performed by: EMERGENCY MEDICINE

## 2025-01-02 PROCEDURE — 96375 TX/PRO/DX INJ NEW DRUG ADDON: CPT

## 2025-01-02 PROCEDURE — 25010000002 METOCLOPRAMIDE PER 10 MG: Performed by: EMERGENCY MEDICINE

## 2025-01-02 PROCEDURE — 81001 URINALYSIS AUTO W/SCOPE: CPT | Performed by: EMERGENCY MEDICINE

## 2025-01-02 PROCEDURE — 80143 DRUG ASSAY ACETAMINOPHEN: CPT | Performed by: EMERGENCY MEDICINE

## 2025-01-02 RX ORDER — SODIUM CHLORIDE 9 MG/ML
10 INJECTION, SOLUTION INTRAMUSCULAR; INTRAVENOUS; SUBCUTANEOUS AS NEEDED
Status: DISCONTINUED | OUTPATIENT
Start: 2025-01-02 | End: 2025-01-03 | Stop reason: HOSPADM

## 2025-01-02 RX ORDER — MORPHINE SULFATE 4 MG/ML
4 INJECTION, SOLUTION INTRAMUSCULAR; INTRAVENOUS ONCE
Status: COMPLETED | OUTPATIENT
Start: 2025-01-02 | End: 2025-01-02

## 2025-01-02 RX ORDER — METOCLOPRAMIDE HYDROCHLORIDE 5 MG/ML
10 INJECTION INTRAMUSCULAR; INTRAVENOUS ONCE
Status: COMPLETED | OUTPATIENT
Start: 2025-01-02 | End: 2025-01-02

## 2025-01-02 RX ADMIN — SODIUM CHLORIDE 1000 ML: 9 INJECTION, SOLUTION INTRAVENOUS at 20:11

## 2025-01-02 RX ADMIN — METOCLOPRAMIDE HYDROCHLORIDE 10 MG: 5 INJECTION INTRAMUSCULAR; INTRAVENOUS at 20:10

## 2025-01-02 RX ADMIN — MORPHINE SULFATE 4 MG: 4 INJECTION, SOLUTION INTRAMUSCULAR; INTRAVENOUS at 20:14

## 2025-01-03 NOTE — DISCHARGE INSTRUCTIONS
Your lab work today showed some abnormal liver function tests.  Please return to the emergency department immediately if you have any recurrent abdominal pain or any worsening of your symptoms as this could represent progression of potential underlying illness.  Please follow-up with your primary care provider soon as you can for reevaluation of your LFTs.